# Patient Record
Sex: MALE | Race: WHITE | NOT HISPANIC OR LATINO | Employment: FULL TIME | URBAN - METROPOLITAN AREA
[De-identification: names, ages, dates, MRNs, and addresses within clinical notes are randomized per-mention and may not be internally consistent; named-entity substitution may affect disease eponyms.]

---

## 2017-07-20 ENCOUNTER — OFFICE VISIT (OUTPATIENT)
Dept: FAMILY MEDICINE CLINIC | Facility: CLINIC | Age: 47
End: 2017-07-20

## 2017-07-20 VITALS
RESPIRATION RATE: 20 BRPM | SYSTOLIC BLOOD PRESSURE: 108 MMHG | WEIGHT: 207 LBS | DIASTOLIC BLOOD PRESSURE: 62 MMHG | HEIGHT: 76 IN | OXYGEN SATURATION: 98 % | BODY MASS INDEX: 25.21 KG/M2 | HEART RATE: 72 BPM

## 2017-07-20 DIAGNOSIS — F41.9 ANXIETY: ICD-10-CM

## 2017-07-20 DIAGNOSIS — G50.0 TRIGEMINAL NEURALGIA OF RIGHT SIDE OF FACE: Primary | ICD-10-CM

## 2017-07-20 PROBLEM — K21.9 GASTROESOPHAGEAL REFLUX DISEASE WITHOUT ESOPHAGITIS: Status: ACTIVE | Noted: 2017-07-20

## 2017-07-20 PROBLEM — K29.00 ACUTE SUPERFICIAL GASTRITIS WITHOUT HEMORRHAGE: Status: ACTIVE | Noted: 2017-07-20

## 2017-07-20 PROCEDURE — 99203 OFFICE O/P NEW LOW 30 MIN: CPT | Performed by: NURSE PRACTITIONER

## 2017-07-20 RX ORDER — LORATADINE 10 MG/1
10 TABLET ORAL DAILY
COMMUNITY

## 2017-07-20 RX ORDER — CARBAMAZEPINE 200 MG/1
TABLET ORAL
COMMUNITY
Start: 2017-05-15 | End: 2017-11-09 | Stop reason: SDUPTHER

## 2017-07-20 NOTE — PROGRESS NOTES
Sara Carter is a 46 y.o. male.   Chief Complaint   Patient presents with   • Trigeminal Neuralgia     new pt needs to estbalish and needs a referral to neuro to continue treatment, also having dizzy spells which is concerning     Vitals:    07/20/17 1247   BP: 108/62   Pulse: 72   Resp: 20   SpO2: 98%     No Known Allergies    HPI Comments: Getting some lightheaded spells. Kind of feels a little anxious. Was happening a lot when stressed out in [place]. Would come in waves every couple of weeks.   Heart palpitations, no chest pain, no SOB.   Most recently last Friday- had heartburn, got a little light headed, tired, and stomach upset. Felt like heart rate was a little high.  When episodes occur they only last a few minutes. Palpitations may only last a few seconds.    Anxiety   Presents for initial visit. Onset was 1 to 6 months ago. The problem has been waxing and waning. Symptoms include muscle tension and palpitations. Patient reports no chest pain, dizziness, excessive worry, feeling of choking, nervous/anxious behavior, obsessions, restlessness, shortness of breath or suicidal ideas. Symptoms occur occasionally. The severity of symptoms is mild. The symptoms are aggravated by caffeine and work stress. The patient sleeps 7 hours per night. The quality of sleep is good. Nighttime awakenings: none.     Risk factors: work stress at previous job. There is no history of anxiety/panic attacks, arrhythmia, depression or suicide attempts. Past treatments include nothing.        The following portions of the patient's history were reviewed and updated as appropriate: allergies, current medications, past family history, past medical history, past social history, past surgical history and problem list.    Review of Systems   Constitutional: Negative.  Negative for chills, diaphoresis and unexpected weight change.   Respiratory: Positive for chest tightness (sometimes). Negative for shortness of breath.     Cardiovascular: Positive for palpitations. Negative for chest pain and leg swelling.   Neurological: Positive for light-headedness. Negative for dizziness, tremors and syncope.   Psychiatric/Behavioral: Negative for agitation, self-injury, sleep disturbance and suicidal ideas. The patient is not nervous/anxious and is not hyperactive.    All other systems reviewed and are negative.      Objective   Physical Exam   Constitutional: He is oriented to person, place, and time. He appears well-developed and well-nourished.   Cardiovascular: Normal rate, regular rhythm, normal heart sounds and intact distal pulses.    No murmur heard.  Pulmonary/Chest: Effort normal and breath sounds normal.   Neurological: He is alert and oriented to person, place, and time.   Skin: Skin is warm and dry.   Psychiatric: He has a normal mood and affect. His behavior is normal. Judgment and thought content normal.   Nursing note and vitals reviewed.      Assessment/Plan   Problems Addressed this Visit        Nervous and Auditory    Trigeminal neuralgia of right side of face - Primary    Relevant Orders    Ambulatory Referral to Neurology      Other Visit Diagnoses     Anxiety            Lab results from previous blood draw were brought in by the patient and we reviewed them together. Counseled on heart healthy diet to improve cholesterol.  If palpitations worsen or become consistent, give office a call and holter will be ordered.   May consider some atarax PRN for anxiety if episode increase in frequency.

## 2017-08-01 ENCOUNTER — TELEPHONE (OUTPATIENT)
Dept: FAMILY MEDICINE CLINIC | Facility: CLINIC | Age: 47
End: 2017-08-01

## 2017-08-01 DIAGNOSIS — F41.9 ANXIETY: Primary | ICD-10-CM

## 2017-08-01 RX ORDER — HYDROXYZINE HYDROCHLORIDE 25 MG/1
25 TABLET, FILM COATED ORAL EVERY 8 HOURS PRN
Qty: 30 TABLET | Refills: 2 | Status: SHIPPED | OUTPATIENT
Start: 2017-08-01 | End: 2017-11-09 | Stop reason: SDUPTHER

## 2017-09-25 ENCOUNTER — OFFICE VISIT (OUTPATIENT)
Dept: FAMILY MEDICINE CLINIC | Facility: CLINIC | Age: 47
End: 2017-09-25

## 2017-09-25 VITALS
RESPIRATION RATE: 20 BRPM | DIASTOLIC BLOOD PRESSURE: 64 MMHG | WEIGHT: 203 LBS | SYSTOLIC BLOOD PRESSURE: 102 MMHG | BODY MASS INDEX: 24.72 KG/M2 | HEIGHT: 76 IN | HEART RATE: 73 BPM | OXYGEN SATURATION: 98 %

## 2017-09-25 DIAGNOSIS — F41.9 ANXIETY: Primary | ICD-10-CM
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
## 2017-11-09 ENCOUNTER — OFFICE VISIT (OUTPATIENT)
Dept: NEUROLOGY | Facility: CLINIC | Age: 47
End: 2017-11-09

## 2017-11-09 VITALS
WEIGHT: 210 LBS | BODY MASS INDEX: 25.57 KG/M2 | DIASTOLIC BLOOD PRESSURE: 80 MMHG | SYSTOLIC BLOOD PRESSURE: 116 MMHG | HEIGHT: 76 IN

## 2017-11-09 DIAGNOSIS — G50.0 TRIGEMINAL NEURALGIA OF RIGHT SIDE OF FACE: Primary | ICD-10-CM

## 2017-11-09 DIAGNOSIS — M54.81 BILATERAL OCCIPITAL NEURALGIA: ICD-10-CM

## 2017-11-09 DIAGNOSIS — F41.9 ANXIETY: ICD-10-CM

## 2017-11-09 PROCEDURE — 99243 OFF/OP CNSLTJ NEW/EST LOW 30: CPT | Performed by: PSYCHIATRY & NEUROLOGY

## 2017-11-09 RX ORDER — HYDROXYZINE HYDROCHLORIDE 25 MG/1
25 TABLET, FILM COATED ORAL EVERY 8 HOURS PRN
Qty: 30 TABLET | Refills: 3 | Status: SHIPPED | OUTPATIENT
Start: 2017-11-09 | End: 2018-04-04 | Stop reason: SDUPTHER

## 2017-11-09 RX ORDER — CARBAMAZEPINE 200 MG/1
200 TABLET ORAL 3 TIMES DAILY
Qty: 270 TABLET | Refills: 3 | Status: SHIPPED | OUTPATIENT
Start: 2017-11-09 | End: 2018-06-14 | Stop reason: SDUPTHER
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
## 2018-01-23 ENCOUNTER — OFFICE VISIT (OUTPATIENT)
Dept: FAMILY MEDICINE CLINIC | Facility: CLINIC | Age: 48
End: 2018-01-23

## 2018-01-23 VITALS
HEART RATE: 81 BPM | BODY MASS INDEX: 25.69 KG/M2 | HEIGHT: 76 IN | WEIGHT: 211 LBS | OXYGEN SATURATION: 98 % | RESPIRATION RATE: 20 BRPM

## 2018-01-23 DIAGNOSIS — J01.10 ACUTE NON-RECURRENT FRONTAL SINUSITIS: Primary | ICD-10-CM

## 2018-01-23 PROCEDURE — 99213 OFFICE O/P EST LOW 20 MIN: CPT | Performed by: NURSE PRACTITIONER

## 2018-01-23 RX ORDER — AMOXICILLIN AND CLAVULANATE POTASSIUM 875; 125 MG/1; MG/1
1 TABLET, FILM COATED ORAL EVERY 12 HOURS SCHEDULED
Qty: 14 TABLET | Refills: 0 | Status: SHIPPED | OUTPATIENT
Start: 2018-01-23 | End: 2018-01-30

## 2018-01-23 NOTE — PROGRESS NOTES
"Sara Carter is a 47 y.o. male.     Chief Complaint   Patient presents with   • Sinusitis     deep wet cough, sneezing, congetsion, sinus pressure x 10- 14 days. ear pain      Social History   Substance Use Topics   • Smoking status: Never Smoker   • Smokeless tobacco: Never Used   • Alcohol use No       URI    This is a new problem. The current episode started 1 to 4 weeks ago (10 days). The problem has been gradually worsening. There has been no fever. Associated symptoms include congestion, coughing, ear pain (left), headaches, rhinorrhea and sinus pain. Pertinent negatives include no sore throat. He has tried nothing for the symptoms.     Review of Systems   Constitutional: Positive for fatigue. Negative for chills and fever.   HENT: Positive for congestion, ear pain (left), rhinorrhea and sinus pain. Negative for sore throat.    Respiratory: Positive for cough.    Neurological: Positive for headaches.   All other systems reviewed and are negative.    Physical Exam   Gen: mildly ill appearing, alert  Ears: Tm's bulging without redness  Nose:  Congestion  Throat:  Red without exudate, some drainage, tonsils okay  Neck: no LAD  Lung: good air movement, regular RR, no wheeze, no rales  Heart: RR without murmur  Skin: no rash.    The following portions of the patient's history were reviewed and updated as appropriate: allergies, current medications,past medical hx, family hx, past social history an...    Chief Complaint   Patient presents with   • Sinusitis     deep wet cough, sneezing, congetsion, sinus pressure x 10- 14 days. ear pain        Vitals:    01/23/18 1313   Pulse: 81   Resp: 20   SpO2: 98%   Weight: 95.7 kg (211 lb)   Height: 193 cm (76\")       Assessment/Plan   Problems Addressed this Visit     None      Visit Diagnoses     Acute non-recurrent frontal sinusitis    -  Primary    Relevant Medications    amoxicillin-clavulanate (AUGMENTIN) 875-125 MG per tablet               Tylenol or Advil " as needed for pain, fever, muscle aches  Plenty of fluids  Hand washing discussed  Off work or school note given if needed.  Warm tea for throat.      Jennifer CHAPMAN  Family Practice  [place]  [facility]

## 2018-04-04 DIAGNOSIS — F41.9 ANXIETY: ICD-10-CM

## 2018-04-04 RX ORDER — HYDROXYZINE HYDROCHLORIDE 25 MG/1
25 TABLET, FILM COATED ORAL EVERY 8 HOURS PRN
Qty: 30 TABLET | Refills: 3 | Status: SHIPPED | OUTPATIENT
Start: 2018-04-04 | End: 2018-08-20 | Stop reason: SDUPTHER

## 2018-04-09 ENCOUNTER — OFFICE VISIT (OUTPATIENT)
Dept: FAMILY MEDICINE CLINIC | Facility: CLINIC | Age: 48
End: 2018-04-09

## 2018-04-09 VITALS
OXYGEN SATURATION: 100 % | HEART RATE: 65 BPM | RESPIRATION RATE: 16 BRPM | TEMPERATURE: 97.9 F | SYSTOLIC BLOOD PRESSURE: 122 MMHG | HEIGHT: 76 IN | DIASTOLIC BLOOD PRESSURE: 64 MMHG | WEIGHT: 212 LBS | BODY MASS INDEX: 25.82 KG/M2

## 2018-04-09 DIAGNOSIS — R19.5 LOOSE STOOLS: ICD-10-CM

## 2018-04-09 DIAGNOSIS — R10.11 RUQ PAIN: Primary | ICD-10-CM

## 2018-04-09 DIAGNOSIS — R11.0 NAUSEA: ICD-10-CM

## 2018-04-09 DIAGNOSIS — G50.0 TRIGEMINAL NEURALGIA OF RIGHT SIDE OF FACE: ICD-10-CM

## 2018-04-09 PROCEDURE — 99213 OFFICE O/P EST LOW 20 MIN: CPT | Performed by: PHYSICIAN ASSISTANT

## 2018-04-09 NOTE — PROGRESS NOTES
Subjective   Donald Carter is a 47 y.o. male.   Chief Complaint   Patient presents with   • Pain     right side       History of Present Illness     Donald is a 47-year-old male who presents for  Right mid abdomen pain off and on for the past 6-8 weeks.  He has had some increased heartburn/reflux off on for the past several months.  He has had some pain radiating to right scapula area off and on for months.   States the pain would waxes and wanes.  Donald has had some loose stool-diarrhea off and on for a few weeks.   Describes the pain as a dull ache that waxes and wane.  States the pain is in th morning.  Eating makes the pain worse.  Donald has had increased flatulence and belching/bloated feeling.  He has had an occasional nauseated feeling.  Denied any dark back tarry stools.  Denied any chest pain,urinary, hematuria,fevers,or chills.  He avoid diary and wheat due to sensitives.  Appetite has been slightly less than normal.  Sleep has been normal.   He takes Prilosec OTC once a day.  Caffeine is 10 oz coffee a day.  Donald states his stress level is normal.  Donald sees neurologist,brenna Calle, for trigeminal neuralgia.  Herbert a tegretol level collected and results sent to him.    The following portions of the patient's history were reviewed and updated as appropriate: allergies, current medications, past family history, past medical history, past social history and past surgical history.    Review of Systems   Constitutional: Negative.  Negative for chills, fatigue and fever.   HENT: Negative.    Eyes: Negative.    Respiratory: Negative.  Negative for cough, shortness of breath and wheezing.    Cardiovascular: Negative.  Negative for chest pain, palpitations and leg swelling.   Gastrointestinal: Positive for abdominal pain, diarrhea and nausea. Negative for blood in stool, constipation, rectal pain and vomiting.        Heartburn,bloated,belching and flatulence   Endocrine: Negative.    Genitourinary: Negative.   "  Musculoskeletal: Negative.    Skin: Negative.    Allergic/Immunologic: Negative.    Neurological: Negative.    Hematological: Negative.    Psychiatric/Behavioral: Negative.    All other systems reviewed and are negative.      Social History     Social History   • Marital status:      Social History Main Topics   • Smoking status: Never Smoker   • Smokeless tobacco: Never Used   • Alcohol use No   • Drug use: No   • Sexual activity: Yes     Partners: Female     Other Topics Concern   • Not on file     Vitals:    04/09/18 1330   BP: 122/64   BP Location: Left arm   Patient Position: Sitting   Cuff Size: Adult   Pulse: 65   Resp: 16   Temp: 97.9 °F (36.6 °C)   TempSrc: Oral   SpO2: 100%   Weight: 96.2 kg (212 lb)   Height: 193 cm (76\")     Wt Readings from Last 3 Encounters:   04/09/18 96.2 kg (212 lb)   01/23/18 95.7 kg (211 lb)   11/09/17 95.3 kg (210 lb)       BP Readings from Last 3 Encounters:   04/09/18 122/64   11/09/17 116/80   09/25/17 102/64       Body mass index is 25.81 kg/m².  No Known Allergies    Objective   Physical Exam   Constitutional: He is oriented to person, place, and time. Vital signs are normal. He appears well-developed and well-nourished.   Neck: Trachea normal and phonation normal. Neck supple. No edema present.   Cardiovascular: Normal rate, regular rhythm, S1 normal, S2 normal, normal heart sounds and normal pulses.    Pulmonary/Chest: Effort normal and breath sounds normal.   Abdominal: Soft. Normal appearance, normal aorta and bowel sounds are normal. There is no hepatomegaly. There is tenderness in the right upper quadrant. There is positive Bhatti's sign. There is no rigidity, no rebound, no guarding, no CVA tenderness and no tenderness at McBurney's point.       Neurological: He is alert and oriented to person, place, and time.   Skin: Skin is warm, dry and intact.   Psychiatric: He has a normal mood and affect. His speech is normal and behavior is normal. Judgment and " thought content normal. Cognition and memory are normal.       Assessment/Plan   Donald was seen today for pain.    Diagnoses and all orders for this visit:    RUQ pain  -     CBC & Differential  -     US Abdomen Complete  -     Amylase  -     Lipase  -     Comprehensive Metabolic Panel    Nausea  -     CBC & Differential  -     US Abdomen Complete  -     Amylase  -     Lipase  -     Comprehensive Metabolic Panel    Loose stools  -     CBC & Differential  -     US Abdomen Complete  -     Amylase  -     Lipase  -     Comprehensive Metabolic Panel    Trigeminal neuralgia of right side of face  -     Carbamazepine level, total    Other orders  -     Carbamazepine Level, Total      Mr. Donald Carter was seen in office today with continuing right upper quadrant pain with nausea and loose stools.  He will have a CBC, CMP, amylase and lipase blood work collected at today's office visit.  I will schedule a ultrasound of abdomen for further evaluation of gallbladder issues.  He will continue his over-the-counter Prilosec for now.  Donald will be notified of test results when completed.  I have asked him to keep a food diary of symptoms as well.  He voiced understanding. In additional to above blood work, he will have a tegretol level collect today. I will send results to his neurologist,brenna Calle.

## 2018-04-10 LAB
ALBUMIN SERPL-MCNC: 4.2 G/DL (ref 3.5–5.2)
ALBUMIN/GLOB SERPL: 1.8 G/DL
ALP SERPL-CCNC: 59 U/L (ref 40–129)
ALT SERPL-CCNC: 18 U/L (ref 5–41)
AMYLASE SERPL-CCNC: 58 U/L (ref 28–100)
AST SERPL-CCNC: 19 U/L (ref 5–40)
BASOPHILS # BLD AUTO: 0.01 10*3/MM3 (ref 0–0.2)
BASOPHILS NFR BLD AUTO: 0.2 % (ref 0–2)
BILIRUB SERPL-MCNC: 0.3 MG/DL (ref 0.2–1.2)
BUN SERPL-MCNC: 10 MG/DL (ref 6–20)
BUN/CREAT SERPL: 12.5 (ref 7–25)
CALCIUM SERPL-MCNC: 9.2 MG/DL (ref 8.6–10.5)
CARBAMAZEPINE SERPL-MCNC: 5.4 MCG/ML (ref 4–12)
CHLORIDE SERPL-SCNC: 96 MMOL/L (ref 98–107)
CO2 SERPL-SCNC: 29.9 MMOL/L (ref 22–29)
CREAT SERPL-MCNC: 0.8 MG/DL (ref 0.76–1.27)
EOSINOPHIL # BLD AUTO: 0 10*3/MM3 (ref 0.1–0.3)
EOSINOPHIL NFR BLD AUTO: 0 % (ref 0–4)
ERYTHROCYTE [DISTWIDTH] IN BLOOD BY AUTOMATED COUNT: 12.1 % (ref 11.5–14.5)
GFR SERPLBLD CREATININE-BSD FMLA CKD-EPI: 104 ML/MIN/1.73
GFR SERPLBLD CREATININE-BSD FMLA CKD-EPI: 126 ML/MIN/1.73
GLOBULIN SER CALC-MCNC: 2.4 GM/DL
GLUCOSE SERPL-MCNC: 88 MG/DL (ref 65–99)
HCT VFR BLD AUTO: 40 % (ref 42–52)
HGB BLD-MCNC: 13.3 G/DL (ref 14–18)
IMM GRANULOCYTES # BLD: 0.01 10*3/MM3 (ref 0–0.03)
IMM GRANULOCYTES NFR BLD: 0.2 % (ref 0–0.5)
LIPASE SERPL-CCNC: 28 U/L (ref 13–60)
LYMPHOCYTES # BLD AUTO: 2.61 10*3/MM3 (ref 0.6–4.8)
LYMPHOCYTES NFR BLD AUTO: 44.2 % (ref 20–45)
MCH RBC QN AUTO: 31.1 PG (ref 27–31)
MCHC RBC AUTO-ENTMCNC: 33.3 G/DL (ref 31–37)
MCV RBC AUTO: 93.7 FL (ref 80–94)
MONOCYTES # BLD AUTO: 0.4 10*3/MM3 (ref 0–1)
MONOCYTES NFR BLD AUTO: 6.8 % (ref 3–8)
NEUTROPHILS # BLD AUTO: 2.87 10*3/MM3 (ref 1.5–8.3)
NEUTROPHILS NFR BLD AUTO: 48.6 % (ref 45–70)
NRBC BLD AUTO-RTO: 0 /100 WBC (ref 0–0)
PLATELET # BLD AUTO: 305 10*3/MM3 (ref 140–500)
POTASSIUM SERPL-SCNC: 4 MMOL/L (ref 3.5–5.2)
PROT SERPL-MCNC: 6.6 G/DL (ref 6–8.5)
RBC # BLD AUTO: 4.27 10*6/MM3 (ref 4.7–6.1)
SODIUM SERPL-SCNC: 135 MMOL/L (ref 136–145)
WBC # BLD AUTO: 5.9 10*3/MM3 (ref 4.8–10.8)

## 2018-04-16 ENCOUNTER — HOSPITAL ENCOUNTER (OUTPATIENT)
Dept: ULTRASOUND IMAGING | Facility: HOSPITAL | Age: 48
Discharge: HOME | End: 2018-04-16
Admitting: PHYSICIAN ASSISTANT

## 2018-04-16 PROCEDURE — 76700 US EXAM ABDOM COMPLETE: CPT

## 2018-04-18 DIAGNOSIS — R19.5 LOOSE STOOLS: ICD-10-CM

## 2018-04-18 DIAGNOSIS — R10.11 RUQ PAIN: Primary | ICD-10-CM

## 2018-04-18 DIAGNOSIS — R11.0 NAUSEA: ICD-10-CM

## 2018-04-25 ENCOUNTER — HOSPITAL ENCOUNTER (OUTPATIENT)
Dept: NUCLEAR MEDICINE | Facility: HOSPITAL | Age: 48
Discharge: HOME | End: 2018-04-25

## 2018-04-25 DIAGNOSIS — R19.5 LOOSE STOOLS: ICD-10-CM

## 2018-04-25 DIAGNOSIS — R11.0 NAUSEA: ICD-10-CM

## 2018-04-25 DIAGNOSIS — R10.11 RUQ PAIN: Primary | ICD-10-CM

## 2018-04-25 DIAGNOSIS — R10.11 RUQ PAIN: ICD-10-CM

## 2018-04-25 PROCEDURE — 0 TECHNETIUM TC 99M MEBROFENIN KIT: Performed by: PHYSICIAN ASSISTANT

## 2018-04-25 PROCEDURE — 78227 HEPATOBIL SYST IMAGE W/DRUG: CPT

## 2018-04-25 PROCEDURE — 25010000002 SINCALIDE PER 5 MCG: Performed by: PHYSICIAN ASSISTANT

## 2018-04-25 PROCEDURE — A9537 TC99M MEBROFENIN: HCPCS | Performed by: PHYSICIAN ASSISTANT

## 2018-04-25 RX ORDER — KIT FOR THE PREPARATION OF TECHNETIUM TC 99M MEBROFENIN 45 MG/10ML
1 INJECTION, POWDER, LYOPHILIZED, FOR SOLUTION INTRAVENOUS
Status: COMPLETED | OUTPATIENT
Start: 2018-04-25 | End: 2018-04-25

## 2018-04-25 RX ADMIN — SINCALIDE 1.9 MCG: 5 INJECTION, POWDER, LYOPHILIZED, FOR SOLUTION INTRAVENOUS at 09:45

## 2018-04-25 RX ADMIN — MEBROFENIN 1 DOSE: 45 INJECTION, POWDER, LYOPHILIZED, FOR SOLUTION INTRAVENOUS at 07:45

## 2018-05-04 ENCOUNTER — TELEPHONE (OUTPATIENT)
Dept: FAMILY MEDICINE CLINIC | Facility: CLINIC | Age: 48
End: 2018-05-04

## 2018-05-04 DIAGNOSIS — R11.0 NAUSEA: ICD-10-CM

## 2018-05-04 DIAGNOSIS — R19.5 LOOSE STOOLS: Primary | ICD-10-CM

## 2018-05-04 RX ORDER — DICYCLOMINE HYDROCHLORIDE 10 MG/1
10 CAPSULE ORAL
Qty: 120 CAPSULE | Refills: 0 | Status: SHIPPED | OUTPATIENT
Start: 2018-05-04 | End: 2018-06-14

## 2018-05-04 NOTE — TELEPHONE ENCOUNTER
I have sent Bentyl medication to pharmacy.  If no improvement please schedule follow-up appointment.  Keep appointment with GI.

## 2018-05-22 ENCOUNTER — OFFICE VISIT (OUTPATIENT)
Dept: GASTROENTEROLOGY | Facility: CLINIC | Age: 48
End: 2018-05-22

## 2018-05-22 VITALS
DIASTOLIC BLOOD PRESSURE: 74 MMHG | BODY MASS INDEX: 25.77 KG/M2 | SYSTOLIC BLOOD PRESSURE: 122 MMHG | WEIGHT: 211.6 LBS | HEIGHT: 76 IN | TEMPERATURE: 97.6 F

## 2018-05-22 DIAGNOSIS — K21.9 GASTROESOPHAGEAL REFLUX DISEASE, ESOPHAGITIS PRESENCE NOT SPECIFIED: ICD-10-CM

## 2018-05-22 DIAGNOSIS — R14.0 BLOATING: ICD-10-CM

## 2018-05-22 DIAGNOSIS — R10.31 RIGHT LOWER QUADRANT ABDOMINAL PAIN: Primary | ICD-10-CM

## 2018-05-22 PROCEDURE — 99204 OFFICE O/P NEW MOD 45 MIN: CPT | Performed by: INTERNAL MEDICINE

## 2018-05-22 NOTE — PROGRESS NOTES
"Chief Complaint   Patient presents with   • Abdominal Pain   • Nausea   • GI Problem     alternating constipation and diarrhea        Subjective     HPI    Donald Carter is a 47 y.o. male with a past medical history noted below who presents for evaluation of abdominal pain.  Symptoms started in January of this year. He began to notice some \"discomfort\" in the RUQ.  It was constant and then became more intermittent.  Evolved to be more like a cramp in quality. Sometimes radiated into his R shoulder.  He found that it was worse with eating, especially greasy and fatty foods.  Workup with his PCP included a normal RUQ US and HIDA. He was given dicyclomine and is taking this QID.  It is helping a bit but not relieving symptoms completely.  He is also taking an otc digestive enzyme.    He is now having some bloating, alternating loose stools and constipation.  Having lots of gas.  Feels fatigued.  His weight has been stable.    He has adjusted his diet -- avoiding red meat, fatty foods, no dairy, no gluten.    EGD in 2016 in [place] for reflux issues, reportedly normal.  He reports negative testing for celiac disease.  He was having reflux symptoms at the time of the EGD    No family history of GI malignancy.  No smoking, no ETOH.  Works at [employer]-- in [place].  No abdominal surgeries.    He does take prilosec regularly.     He had a normal amylase and lipase in April.  His carbamazepine level was normal.  His CMP was normal as well.        Past Medical History:   Diagnosis Date   • Gastritis    • GERD (gastroesophageal reflux disease)    • Trigeminal neuralgia          Current Outpatient Prescriptions:   •  carBAMazepine (TEGretol) 200 MG tablet, Take 1 tablet by mouth 3 (Three) Times a Day., Disp: 270 tablet, Rfl: 3  •  dicyclomine (BENTYL) 10 MG capsule, Take 1 capsule by mouth 4 (Four) Times a Day Before Meals & at Bedtime., Disp: 120 capsule, Rfl: 0  •  Digestive Enzymes (DIGESTIVE ENZYME PO), Take  by mouth., " Disp: , Rfl:   •  hydrOXYzine (ATARAX) 25 MG tablet, Take 1 tablet by mouth Every 8 (Eight) Hours As Needed for Itching or Anxiety., Disp: 30 tablet, Rfl: 3  •  Multiple Vitamins-Minerals (MULTIVITAMIN PO), Take  by mouth., Disp: , Rfl:   •  Omeprazole Magnesium (PRILOSEC OTC PO), Take  by mouth., Disp: , Rfl:   •  Cholecalciferol (VITAMIN D3) 5000 UNITS capsule capsule, Take 5,000 Units by mouth Daily., Disp: , Rfl:   •  loratadine (CLARITIN) 10 MG tablet, Take 10 mg by mouth Daily., Disp: , Rfl:     No Known Allergies    Social History     Social History   • Marital status:      Spouse name: N/A   • Number of children: N/A   • Years of education: N/A     Occupational History   • Not on file.     Social History Main Topics   • Smoking status: Never Smoker   • Smokeless tobacco: Never Used   • Alcohol use No   • Drug use: No   • Sexual activity: Yes     Partners: Female     Other Topics Concern   • Not on file     Social History Narrative   • No narrative on file       Family History   Problem Relation Age of Onset   • Hypertension Mother    • Hyperlipidemia Mother    • Hypertension Father    • Hyperlipidemia Father    • No Known Problems Brother    • No Known Problems Daughter    • No Known Problems Son    • COPD Paternal Grandfather    • Lupus Paternal Grandfather        Review of Systems   Constitutional: Positive for fatigue. Negative for activity change and appetite change.   HENT: Negative for sore throat and trouble swallowing.    Respiratory: Negative.    Cardiovascular: Negative.    Gastrointestinal: Positive for abdominal distention, abdominal pain, constipation and diarrhea. Negative for blood in stool.   Endocrine: Negative for cold intolerance and heat intolerance.   Genitourinary: Negative for difficulty urinating, dysuria and frequency.   Musculoskeletal: Negative for arthralgias, back pain and myalgias.   Skin: Negative.    Hematological: Negative for adenopathy. Does not bruise/bleed easily.    All other systems reviewed and are negative.      Objective     Vitals:    05/22/18 1352   BP: 122/74   Temp: 97.6 °F (36.4 °C)     1    05/22/18  1352   Weight: 96 kg (211 lb 9.6 oz)     Body mass index is 25.76 kg/m².    Physical Exam   Constitutional: He is oriented to person, place, and time. He appears well-developed and well-nourished. No distress.   HENT:   Head: Normocephalic and atraumatic.   Right Ear: External ear normal.   Left Ear: External ear normal.   Nose: Nose normal.   Mouth/Throat: Oropharynx is clear and moist.   Eyes: Conjunctivae and EOM are normal. Right eye exhibits no discharge. Left eye exhibits no discharge. No scleral icterus.   Neck: Normal range of motion. Neck supple. No thyromegaly present.   No supraclavicular adenopathy   Cardiovascular: Normal rate, regular rhythm, normal heart sounds and intact distal pulses.  Exam reveals no gallop.    No murmur heard.  No lower extremity edema   Pulmonary/Chest: Effort normal and breath sounds normal. No respiratory distress. He has no wheezes.   Abdominal: Soft. Normal appearance and bowel sounds are normal. He exhibits no distension and no mass. There is no hepatosplenomegaly. There is tenderness. There is no rigidity, no rebound and no guarding. No hernia.   Quite tender to palpation in the right mid lower quadrant   Genitourinary:   Genitourinary Comments: Rectal exam deferred   Musculoskeletal: Normal range of motion. He exhibits no edema or tenderness.   No atrophy of upper or lower extremities.  Normal digits and nails of both hands.   Lymphadenopathy:     He has no cervical adenopathy.   Neurological: He is alert and oriented to person, place, and time. He displays no atrophy. Coordination normal.   Skin: Skin is warm and dry. No rash noted. He is not diaphoretic. No erythema.   Psychiatric: He has a normal mood and affect. His behavior is normal. Judgment and thought content normal.   Vitals reviewed.      WBC   Date Value Ref Range  Status   04/09/2018 5.90 4.80 - 10.80 10*3/mm3 Final     RBC   Date Value Ref Range Status   04/09/2018 4.27 (L) 4.70 - 6.10 10*6/mm3 Final     Hemoglobin   Date Value Ref Range Status   04/09/2018 13.3 (L) 14.0 - 18.0 g/dL Final     Hematocrit   Date Value Ref Range Status   04/09/2018 40.0 (L) 42.0 - 52.0 % Final     MCV   Date Value Ref Range Status   04/09/2018 93.7 80.0 - 94.0 fL Final     MCH   Date Value Ref Range Status   04/09/2018 31.1 (H) 27.0 - 31.0 pg Final     MCHC   Date Value Ref Range Status   04/09/2018 33.3 31.0 - 37.0 g/dL Final     RDW   Date Value Ref Range Status   04/09/2018 12.1 11.5 - 14.5 % Final     Platelets   Date Value Ref Range Status   04/09/2018 305 140 - 500 10*3/mm3 Final     Neutrophil Rel %   Date Value Ref Range Status   04/09/2018 48.6 45.0 - 70.0 % Final     Lymphocyte Rel %   Date Value Ref Range Status   04/09/2018 44.2 20.0 - 45.0 % Final     Monocyte Rel %   Date Value Ref Range Status   04/09/2018 6.8 3.0 - 8.0 % Final     Eosinophil Rel %   Date Value Ref Range Status   04/09/2018 0.0 0.0 - 4.0 % Final     Basophil Rel %   Date Value Ref Range Status   04/09/2018 0.2 0.0 - 2.0 % Final     Neutrophils Absolute   Date Value Ref Range Status   04/09/2018 2.87 1.50 - 8.30 10*3/mm3 Final     Lymphocytes Absolute   Date Value Ref Range Status   04/09/2018 2.61 0.60 - 4.80 10*3/mm3 Final     Monocytes Absolute   Date Value Ref Range Status   04/09/2018 0.40 0.00 - 1.00 10*3/mm3 Final     Eosinophils Absolute   Date Value Ref Range Status   04/09/2018 0.00 (L) 0.10 - 0.30 10*3/mm3 Final     Basophils Absolute   Date Value Ref Range Status   04/09/2018 0.01 0.00 - 0.20 10*3/mm3 Final     nRBC   Date Value Ref Range Status   04/09/2018 0.0 0.0 - 0.0 /100 WBC Final       Sodium   Date Value Ref Range Status   04/09/2018 135 (L) 136 - 145 mmol/L Final     Potassium   Date Value Ref Range Status   04/09/2018 4.0 3.5 - 5.2 mmol/L Final     Total CO2   Date Value Ref Range Status    04/09/2018 29.9 (H) 22.0 - 29.0 mmol/L Final     Chloride   Date Value Ref Range Status   04/09/2018 96 (L) 98 - 107 mmol/L Final     Creatinine   Date Value Ref Range Status   04/09/2018 0.80 0.76 - 1.27 mg/dL Final     BUN   Date Value Ref Range Status   04/09/2018 10 6 - 20 mg/dL Final     BUN/Creatinine Ratio   Date Value Ref Range Status   04/09/2018 12.5 7.0 - 25.0 Final     Calcium   Date Value Ref Range Status   04/09/2018 9.2 8.6 - 10.5 mg/dL Final     eGFR Non  Am   Date Value Ref Range Status   04/09/2018 104 >60 mL/min/1.73 Final     Alkaline Phosphatase   Date Value Ref Range Status   04/09/2018 59 40 - 129 U/L Final     ALT (SGPT)   Date Value Ref Range Status   04/09/2018 18 5 - 41 U/L Final     AST (SGOT)   Date Value Ref Range Status   04/09/2018 19 5 - 40 U/L Final     Total Bilirubin   Date Value Ref Range Status   04/09/2018 0.3 0.2 - 1.2 mg/dL Final     Albumin   Date Value Ref Range Status   04/09/2018 4.20 3.50 - 5.20 g/dL Final     A/G Ratio   Date Value Ref Range Status   04/09/2018 1.8 g/dL Final       RUQ US  IMPRESSION:  Normal abdominal sonogram.     This report was finalized on 4/17/2018 12:30 PM by Dr. Baldomero Valadez MD.    HIDA  IMPRESSION:  No evidence for cystic duct or common duct obstruction.  Gallbladder  ejection fraction 75%.     This report was finalized on 4/25/2018 12:30 PM by Dr. Pako Gee MD.      No notes on file    Assessment/Plan    Right lower abdominal pain: Interestingly he is quite tender in this area.  His gallbladder workup has been normal and I wonder if this has been actually imaging the wrong region.?  Ileitis    Bloating: Associated with above.?  Functional issue versus dysbiosis    GERD: Stable on omeprazole    Plan    CT of the abdomen and pelvis for further evaluation of this right lower quadrant pain  Trial of VS L #3 probiotic for the bloating  Continue dicyclomine  Add in daily fiber supplementation--recommendations for possible  supplements given    Donald was seen today for abdominal pain, nausea and gi problem.    Diagnoses and all orders for this visit:    Right lower quadrant abdominal pain  -     CT abdomen pelvis w contrast; Future    Bloating    Gastroesophageal reflux disease, esophagitis presence not specified        I have discussed the above plan with the patient.  They verbalize understanding and are in agreement with the plan.  They have been advised to contact the office for any questions, concerns, or changes related to their health.    Dictated utilizing Dragon dictation

## 2018-05-22 NOTE — PATIENT INSTRUCTIONS
Continue the dicyclomine    Schedule the CT scan    Start the VSL # 3 probiotic    Add in a daily fiber supplement (metamucil, citrucel, etc)    For any additional questions, concerns or changes to your condition after today's office visit please contact the office at [phone].

## 2018-05-31 ENCOUNTER — HOSPITAL ENCOUNTER (OUTPATIENT)
Dept: CT IMAGING | Facility: HOSPITAL | Age: 48
Discharge: HOME | End: 2018-05-31
Attending: INTERNAL MEDICINE | Admitting: INTERNAL MEDICINE

## 2018-05-31 DIAGNOSIS — R10.31 RIGHT LOWER QUADRANT ABDOMINAL PAIN: ICD-10-CM

## 2018-05-31 PROCEDURE — 74177 CT ABD & PELVIS W/CONTRAST: CPT

## 2018-05-31 PROCEDURE — 25010000002 IOPAMIDOL 61 % SOLUTION: Performed by: INTERNAL MEDICINE

## 2018-05-31 RX ADMIN — IOPAMIDOL 85 ML: 612 INJECTION, SOLUTION INTRAVENOUS at 08:06

## 2018-06-04 ENCOUNTER — TELEPHONE (OUTPATIENT)
Dept: GASTROENTEROLOGY | Facility: CLINIC | Age: 48
End: 2018-06-04

## 2018-06-04 DIAGNOSIS — G89.29 CHRONIC RLQ PAIN: Primary | ICD-10-CM

## 2018-06-04 DIAGNOSIS — R10.31 CHRONIC RLQ PAIN: Primary | ICD-10-CM

## 2018-06-04 NOTE — PROGRESS NOTES
Please let him know there is a small stone within the appendix.  There is no associated inflammation.  However the radiologist feels that this may be causing some chronic issues with the pain.  I'm referring him to general surgery for further evaluation.    To ER for any worsening symptoms.

## 2018-06-04 NOTE — TELEPHONE ENCOUNTER
Call to pt.  Advise per Dr Mijares that small stone within the appendix.  No associated inflammation.  However, the radiologist feels that this may be causing some chronic issues with the pain.  Referral has been placed to general surgery for further eval.    To ER for any worsening symptoms.  Pt verb understanding.

## 2018-06-04 NOTE — TELEPHONE ENCOUNTER
----- Message from Arlen Mijares MD sent at 6/4/2018 12:21 PM EDT -----  Please let him know there is a small stone within the appendix.  There is no associated inflammation.  However the radiologist feels that this may be causing some chronic issues with the pain.  I'm referring him to general surgery for further evaluation.    To ER for any worsening symptoms.

## 2018-06-14 ENCOUNTER — OFFICE VISIT (OUTPATIENT)
Dept: SURGERY | Facility: CLINIC | Age: 48
End: 2018-06-14

## 2018-06-14 VITALS — BODY MASS INDEX: 24.6 KG/M2 | HEIGHT: 76 IN | HEART RATE: 74 BPM | OXYGEN SATURATION: 97 % | WEIGHT: 202 LBS

## 2018-06-14 DIAGNOSIS — R10.31 RIGHT LOWER QUADRANT PAIN: ICD-10-CM

## 2018-06-14 DIAGNOSIS — G50.0 TRIGEMINAL NEURALGIA OF RIGHT SIDE OF FACE: ICD-10-CM

## 2018-06-14 DIAGNOSIS — K38.1 FECALITH OF APPENDIX: Primary | ICD-10-CM

## 2018-06-14 DIAGNOSIS — M54.81 OCCIPITAL NEURALGIA, UNSPECIFIED LATERALITY: Primary | ICD-10-CM

## 2018-06-14 PROCEDURE — 99203 OFFICE O/P NEW LOW 30 MIN: CPT | Performed by: SURGERY

## 2018-06-14 RX ORDER — CARBAMAZEPINE 200 MG/1
400 TABLET ORAL 2 TIMES DAILY
Qty: 360 TABLET | Refills: 3 | Status: SHIPPED | OUTPATIENT
Start: 2018-06-14 | End: 2019-08-18 | Stop reason: SDUPTHER

## 2018-06-14 RX ORDER — FLUTICASONE PROPIONATE 50 MCG
2 SPRAY, SUSPENSION (ML) NASAL DAILY
COMMUNITY

## 2018-06-14 RX ORDER — CEFAZOLIN SODIUM 2 G/100ML
2 INJECTION, SOLUTION INTRAVENOUS ONCE
Status: CANCELLED | OUTPATIENT
Start: 2018-06-22 | End: 2018-06-22

## 2018-06-14 NOTE — PROGRESS NOTES
General Surgery  Initial Office Visit    CC: Abdominal pain    HPI: The patient is a pleasant 47 y.o. year-old gentleman who presents today for evaluation of right lower quadrant abdominal pain that is a stabbing/aching sensation that occurs daily but only intermittently with radiation to the back.  The pain is worse with movement and also after eating.  The frequency of these right lower quadrant abdominal painful attacks is increasing in frequency, and the number of days that he is pain-free are becoming more and more rare.  He denies any fevers but has had subjective chills and flushing.  He had workup at the end of April for this involving a gallbladder ultrasound as well as a HIDA scan, both of which returned normal.  He was referred to gastroenterology, and saw Dr. Arlen Mijares at the end of May.  A CT of the abdomen and pelvis was performed given his focal right lower quadrant abdominal pain, and this revealed a fecalith near the distal end of the appendix with some mural wall thickening.  He was referred to see me for possible chronic appendicitis.  Along with the abdominal pain, he has noticed some associated abdominal bloating fatigue.    Past Medical History:   GERD  Trigeminal neuralgia  Occipital neuralgia    Past Surgical History:   EGD (in [place])    Medications:   Tegretol 200 mg each morning and 400 mg nightly  Fiber supplement daily  Claritin 10 mg daily  Prilosec OTC once daily  Atarax 10 mg daily  Multivitamin daily  Flonase once daily    Allergies: No known drug allergies, positive wheat and dairy allergies    Family History: No family history of gastrointestinal malignancy    Social History:  of  for the [employer], nonsmoker, no regular alcohol use,     ROS:   Constitutional: Positive for fatigue and subjective chills; Negative for fevers  HENT: Positive for mouth sores due to wheat and dairy allergies; Negative for hearing loss or runny nose  Eyes: Negative for  vision changes or scleral icterus  Respiratory: Negative for cough or shortness of breath  Cardiovascular: Negative for chest pain or heart palpitations  Gastrointestinal: As a for abdominal pain; Negative for nausea, vomiting, constipation, melena, or hematochezia  Genitourinary: Negative for hematuria or dysuria  Musculoskeletal: Negative for joint pain or back pain  Neurologic: Negative for headaches or dizziness  Psychiatric: Negative for anxiety or depression  All other systems reviewed and negative    Physical Exam:  Vitals:    06/14/18 1347   Pulse: 74   SpO2: 97%   Height: 76 inches  Weight 202 lb  BMI 24.6  General: No acute distress, well-nourished & well-developed  HEAD: normocephalic, atraumatic  EYES: normal conjunctiva, sclera anicteric  EARS: grossly normal hearing  NECK: supple, no thyromegaly  CARDIOVASCULAR: regular rate and rhythm  RESPIRATORY: clear to auscultation bilaterally  GASTROINTESTINAL: soft, nontender, non-distended  MUSCULOSKELETAL: normal gait and station. No gross extremity abnormalities  PSYCHIATRIC: oriented x3, normal mood and affect    IMAGING:  CT ABDOMEN/PELVIS (May 31, 2018):  IMPRESSION:  1. No acute abnormality within the abdomen and pelvis.  2. Prominent 7-8 mm appendicolith within the mid to distal body of the appendix with mild circumferential mural thickening at this level. No evidence of any acute associated inflammatory change. Chronic appendicitis is a possibility.    HIDA SCAN (4/25/2018):  IMPRESSION:  No evidence for cystic duct or common duct obstruction.  Gallbladder ejection fraction 75%.    ABDOMINAL ULTRASOUND (4/16/2018):  IMPRESSION:  Normal abdominal sonogram.    ASSESSMENT & PLAN  Mr. jenkins is a 47-year-old gentleman with right lower quadrant pain, likely due to an intraluminal fecalith at the distal end of the appendix.  I personally reviewed his CT scan and have recommended we proceed with a laparoscopic appendectomy.  I discussed the risks of this  procedure to include bleeding, possible wound infection, and possible damage to surrounding structures.  Despite these risks, he has consented to proceed and we have scheduled him for next Friday.    Haydee Cortez MD  General and Endoscopic Surgery  [facility]    [address] [place] [address], [address]  [place], [place], [address]  P: [phone]  F: [phone]

## 2018-06-20 ENCOUNTER — APPOINTMENT (OUTPATIENT)
Dept: PREADMISSION TESTING | Facility: HOSPITAL | Age: 48
End: 2018-06-20

## 2018-06-20 VITALS
SYSTOLIC BLOOD PRESSURE: 110 MMHG | BODY MASS INDEX: 27.21 KG/M2 | DIASTOLIC BLOOD PRESSURE: 72 MMHG | HEART RATE: 70 BPM | TEMPERATURE: 98 F | RESPIRATION RATE: 20 BRPM | OXYGEN SATURATION: 99 % | HEIGHT: 74 IN | WEIGHT: 212 LBS

## 2018-06-20 DIAGNOSIS — K38.1 FECALITH OF APPENDIX: ICD-10-CM

## 2018-06-20 DIAGNOSIS — R10.31 RIGHT LOWER QUADRANT PAIN: ICD-10-CM

## 2018-06-20 LAB
ANION GAP SERPL CALCULATED.3IONS-SCNC: 10.2 MMOL/L
BUN BLD-MCNC: 10 MG/DL (ref 6–20)
BUN/CREAT SERPL: 11.6 (ref 7–25)
CALCIUM SPEC-SCNC: 8.6 MG/DL (ref 8.6–10.5)
CHLORIDE SERPL-SCNC: 89 MMOL/L (ref 98–107)
CO2 SERPL-SCNC: 23.8 MMOL/L (ref 22–29)
CREAT BLD-MCNC: 0.86 MG/DL (ref 0.76–1.27)
DEPRECATED RDW RBC AUTO: 40 FL (ref 37–54)
ERYTHROCYTE [DISTWIDTH] IN BLOOD BY AUTOMATED COUNT: 12 % (ref 11.5–14.5)
GFR SERPL CREATININE-BSD FRML MDRD: 96 ML/MIN/1.73
GLUCOSE BLD-MCNC: 96 MG/DL (ref 65–99)
HCT VFR BLD AUTO: 37.8 % (ref 40.4–52.2)
HGB BLD-MCNC: 12.8 G/DL (ref 13.7–17.6)
MCH RBC QN AUTO: 30.9 PG (ref 27–32.7)
MCHC RBC AUTO-ENTMCNC: 33.9 G/DL (ref 32.6–36.4)
MCV RBC AUTO: 91.3 FL (ref 79.8–96.2)
PLATELET # BLD AUTO: 250 10*3/MM3 (ref 140–500)
PMV BLD AUTO: 9 FL (ref 6–12)
POTASSIUM BLD-SCNC: 3.7 MMOL/L (ref 3.5–5.2)
RBC # BLD AUTO: 4.14 10*6/MM3 (ref 4.6–6)
SODIUM BLD-SCNC: 123 MMOL/L (ref 136–145)
WBC NRBC COR # BLD: 5.07 10*3/MM3 (ref 4.5–10.7)

## 2018-06-20 PROCEDURE — 85027 COMPLETE CBC AUTOMATED: CPT | Performed by: SURGERY

## 2018-06-20 PROCEDURE — 80048 BASIC METABOLIC PNL TOTAL CA: CPT | Performed by: SURGERY

## 2018-06-20 PROCEDURE — 36415 COLL VENOUS BLD VENIPUNCTURE: CPT

## 2018-06-22 ENCOUNTER — HOSPITAL ENCOUNTER (OUTPATIENT)
Facility: HOSPITAL | Age: 48
Setting detail: HOSPITAL OUTPATIENT SURGERY
Discharge: HOME | End: 2018-06-22
Attending: SURGERY | Admitting: SURGERY

## 2018-06-22 ENCOUNTER — ANESTHESIA EVENT (OUTPATIENT)
Dept: PERIOP | Facility: HOSPITAL | Age: 48
End: 2018-06-22

## 2018-06-22 ENCOUNTER — ANESTHESIA (OUTPATIENT)
Dept: PERIOP | Facility: HOSPITAL | Age: 48
End: 2018-06-22

## 2018-06-22 VITALS
HEART RATE: 76 BPM | WEIGHT: 205.8 LBS | RESPIRATION RATE: 16 BRPM | BODY MASS INDEX: 25.06 KG/M2 | TEMPERATURE: 97.7 F | SYSTOLIC BLOOD PRESSURE: 129 MMHG | OXYGEN SATURATION: 96 % | HEIGHT: 76 IN | DIASTOLIC BLOOD PRESSURE: 76 MMHG

## 2018-06-22 DIAGNOSIS — R10.31 RIGHT LOWER QUADRANT PAIN: ICD-10-CM

## 2018-06-22 DIAGNOSIS — K38.1 FECALITH OF APPENDIX: ICD-10-CM

## 2018-06-22 PROBLEM — K36 CHRONIC APPENDICITIS: Status: ACTIVE | Noted: 2018-06-22

## 2018-06-22 PROCEDURE — 25010000002 MIDAZOLAM PER 1 MG: Performed by: NURSE ANESTHETIST, CERTIFIED REGISTERED

## 2018-06-22 PROCEDURE — 25010000002 HYDROMORPHONE PER 4 MG: Performed by: NURSE ANESTHETIST, CERTIFIED REGISTERED

## 2018-06-22 PROCEDURE — 25010000002 FENTANYL CITRATE (PF) 100 MCG/2ML SOLUTION: Performed by: NURSE ANESTHETIST, CERTIFIED REGISTERED

## 2018-06-22 PROCEDURE — 25010000003 CEFAZOLIN IN DEXTROSE 2-4 GM/100ML-% SOLUTION: Performed by: SURGERY

## 2018-06-22 PROCEDURE — 25010000002 ONDANSETRON PER 1 MG: Performed by: NURSE ANESTHETIST, CERTIFIED REGISTERED

## 2018-06-22 PROCEDURE — 44970 LAPAROSCOPY APPENDECTOMY: CPT | Performed by: SURGERY

## 2018-06-22 PROCEDURE — 25010000002 PROPOFOL 10 MG/ML EMULSION: Performed by: NURSE ANESTHETIST, CERTIFIED REGISTERED

## 2018-06-22 PROCEDURE — 25010000002 NEOSTIGMINE PER 0.5 MG: Performed by: NURSE ANESTHETIST, CERTIFIED REGISTERED

## 2018-06-22 PROCEDURE — 25010000002 DEXAMETHASONE PER 1 MG: Performed by: NURSE ANESTHETIST, CERTIFIED REGISTERED

## 2018-06-22 PROCEDURE — 88304 TISSUE EXAM BY PATHOLOGIST: CPT | Performed by: SURGERY

## 2018-06-22 PROCEDURE — 25010000002 MIDAZOLAM PER 1 MG: Performed by: ANESTHESIOLOGY

## 2018-06-22 RX ORDER — OXYCODONE HYDROCHLORIDE AND ACETAMINOPHEN 5; 325 MG/1; MG/1
1 TABLET ORAL EVERY 4 HOURS PRN
Qty: 30 TABLET | Refills: 0 | Status: SHIPPED | OUTPATIENT
Start: 2018-06-22 | End: 2018-07-10

## 2018-06-22 RX ORDER — ROCURONIUM BROMIDE 10 MG/ML
INJECTION, SOLUTION INTRAVENOUS AS NEEDED
Status: DISCONTINUED | OUTPATIENT
Start: 2018-06-22 | End: 2018-06-22 | Stop reason: SURG

## 2018-06-22 RX ORDER — HYDROMORPHONE HCL 110MG/55ML
PATIENT CONTROLLED ANALGESIA SYRINGE INTRAVENOUS AS NEEDED
Status: DISCONTINUED | OUTPATIENT
Start: 2018-06-22 | End: 2018-06-22 | Stop reason: SURG

## 2018-06-22 RX ORDER — HYDROMORPHONE HYDROCHLORIDE 1 MG/ML
0.5 INJECTION, SOLUTION INTRAMUSCULAR; INTRAVENOUS; SUBCUTANEOUS
Status: DISCONTINUED | OUTPATIENT
Start: 2018-06-22 | End: 2018-06-22 | Stop reason: HOSPADM

## 2018-06-22 RX ORDER — PROMETHAZINE HYDROCHLORIDE 25 MG/1
25 SUPPOSITORY RECTAL ONCE AS NEEDED
Status: DISCONTINUED | OUTPATIENT
Start: 2018-06-22 | End: 2018-06-22 | Stop reason: HOSPADM

## 2018-06-22 RX ORDER — NALOXONE HCL 0.4 MG/ML
0.2 VIAL (ML) INJECTION AS NEEDED
Status: DISCONTINUED | OUTPATIENT
Start: 2018-06-22 | End: 2018-06-22 | Stop reason: HOSPADM

## 2018-06-22 RX ORDER — FLUMAZENIL 0.1 MG/ML
0.2 INJECTION INTRAVENOUS AS NEEDED
Status: DISCONTINUED | OUTPATIENT
Start: 2018-06-22 | End: 2018-06-22 | Stop reason: HOSPADM

## 2018-06-22 RX ORDER — GLYCOPYRROLATE 0.2 MG/ML
INJECTION INTRAMUSCULAR; INTRAVENOUS AS NEEDED
Status: DISCONTINUED | OUTPATIENT
Start: 2018-06-22 | End: 2018-06-22 | Stop reason: SURG

## 2018-06-22 RX ORDER — DEXAMETHASONE SODIUM PHOSPHATE 10 MG/ML
INJECTION INTRAMUSCULAR; INTRAVENOUS AS NEEDED
Status: DISCONTINUED | OUTPATIENT
Start: 2018-06-22 | End: 2018-06-22 | Stop reason: SURG

## 2018-06-22 RX ORDER — MIDAZOLAM HYDROCHLORIDE 1 MG/ML
2 INJECTION INTRAMUSCULAR; INTRAVENOUS
Status: DISCONTINUED | OUTPATIENT
Start: 2018-06-22 | End: 2018-06-22 | Stop reason: HOSPADM

## 2018-06-22 RX ORDER — EPHEDRINE SULFATE 50 MG/ML
5 INJECTION, SOLUTION INTRAVENOUS ONCE AS NEEDED
Status: DISCONTINUED | OUTPATIENT
Start: 2018-06-22 | End: 2018-06-22 | Stop reason: HOSPADM

## 2018-06-22 RX ORDER — LABETALOL HYDROCHLORIDE 5 MG/ML
5 INJECTION, SOLUTION INTRAVENOUS
Status: DISCONTINUED | OUTPATIENT
Start: 2018-06-22 | End: 2018-06-22 | Stop reason: HOSPADM

## 2018-06-22 RX ORDER — AMOXICILLIN AND CLAVULANATE POTASSIUM 875; 125 MG/1; MG/1
1 TABLET, FILM COATED ORAL 2 TIMES DAILY
Qty: 14 TABLET | Refills: 0 | Status: SHIPPED | OUTPATIENT
Start: 2018-06-22 | End: 2018-06-29

## 2018-06-22 RX ORDER — PROMETHAZINE HYDROCHLORIDE 25 MG/1
12.5 TABLET ORAL ONCE AS NEEDED
Status: DISCONTINUED | OUTPATIENT
Start: 2018-06-22 | End: 2018-06-22 | Stop reason: HOSPADM

## 2018-06-22 RX ORDER — OXYCODONE AND ACETAMINOPHEN 7.5; 325 MG/1; MG/1
1 TABLET ORAL ONCE AS NEEDED
Status: COMPLETED | OUTPATIENT
Start: 2018-06-22 | End: 2018-06-22

## 2018-06-22 RX ORDER — MAGNESIUM HYDROXIDE 1200 MG/15ML
LIQUID ORAL AS NEEDED
Status: DISCONTINUED | OUTPATIENT
Start: 2018-06-22 | End: 2018-06-22 | Stop reason: HOSPADM

## 2018-06-22 RX ORDER — BUPIVACAINE HYDROCHLORIDE AND EPINEPHRINE 5; 5 MG/ML; UG/ML
INJECTION, SOLUTION EPIDURAL; INTRACAUDAL; PERINEURAL AS NEEDED
Status: DISCONTINUED | OUTPATIENT
Start: 2018-06-22 | End: 2018-06-22 | Stop reason: HOSPADM

## 2018-06-22 RX ORDER — CEFAZOLIN SODIUM 2 G/100ML
2 INJECTION, SOLUTION INTRAVENOUS ONCE
Status: COMPLETED | OUTPATIENT
Start: 2018-06-22 | End: 2018-06-22

## 2018-06-22 RX ORDER — PROPOFOL 10 MG/ML
VIAL (ML) INTRAVENOUS AS NEEDED
Status: DISCONTINUED | OUTPATIENT
Start: 2018-06-22 | End: 2018-06-22 | Stop reason: SURG

## 2018-06-22 RX ORDER — FENTANYL CITRATE 50 UG/ML
50 INJECTION, SOLUTION INTRAMUSCULAR; INTRAVENOUS
Status: DISCONTINUED | OUTPATIENT
Start: 2018-06-22 | End: 2018-06-22 | Stop reason: HOSPADM

## 2018-06-22 RX ORDER — MIDAZOLAM HYDROCHLORIDE 1 MG/ML
1 INJECTION INTRAMUSCULAR; INTRAVENOUS
Status: DISCONTINUED | OUTPATIENT
Start: 2018-06-22 | End: 2018-06-22 | Stop reason: HOSPADM

## 2018-06-22 RX ORDER — MIDAZOLAM HYDROCHLORIDE 1 MG/ML
INJECTION INTRAMUSCULAR; INTRAVENOUS AS NEEDED
Status: DISCONTINUED | OUTPATIENT
Start: 2018-06-22 | End: 2018-06-22 | Stop reason: SURG

## 2018-06-22 RX ORDER — PROMETHAZINE HYDROCHLORIDE 25 MG/ML
12.5 INJECTION, SOLUTION INTRAMUSCULAR; INTRAVENOUS ONCE AS NEEDED
Status: DISCONTINUED | OUTPATIENT
Start: 2018-06-22 | End: 2018-06-22 | Stop reason: HOSPADM

## 2018-06-22 RX ORDER — PROMETHAZINE HYDROCHLORIDE 25 MG/1
25 TABLET ORAL ONCE AS NEEDED
Status: DISCONTINUED | OUTPATIENT
Start: 2018-06-22 | End: 2018-06-22 | Stop reason: HOSPADM

## 2018-06-22 RX ORDER — ONDANSETRON 2 MG/ML
4 INJECTION INTRAMUSCULAR; INTRAVENOUS ONCE AS NEEDED
Status: DISCONTINUED | OUTPATIENT
Start: 2018-06-22 | End: 2018-06-22 | Stop reason: HOSPADM

## 2018-06-22 RX ORDER — FENTANYL CITRATE 50 UG/ML
INJECTION, SOLUTION INTRAMUSCULAR; INTRAVENOUS AS NEEDED
Status: DISCONTINUED | OUTPATIENT
Start: 2018-06-22 | End: 2018-06-22 | Stop reason: SURG

## 2018-06-22 RX ORDER — SODIUM CHLORIDE 0.9 % (FLUSH) 0.9 %
1-10 SYRINGE (ML) INJECTION AS NEEDED
Status: DISCONTINUED | OUTPATIENT
Start: 2018-06-22 | End: 2018-06-22 | Stop reason: HOSPADM

## 2018-06-22 RX ORDER — HYDROCODONE BITARTRATE AND ACETAMINOPHEN 7.5; 325 MG/1; MG/1
1 TABLET ORAL ONCE AS NEEDED
Status: DISCONTINUED | OUTPATIENT
Start: 2018-06-22 | End: 2018-06-22 | Stop reason: HOSPADM

## 2018-06-22 RX ORDER — LIDOCAINE HYDROCHLORIDE 10 MG/ML
0.5 INJECTION, SOLUTION EPIDURAL; INFILTRATION; INTRACAUDAL; PERINEURAL ONCE AS NEEDED
Status: DISCONTINUED | OUTPATIENT
Start: 2018-06-22 | End: 2018-06-22 | Stop reason: HOSPADM

## 2018-06-22 RX ORDER — DIPHENHYDRAMINE HYDROCHLORIDE 50 MG/ML
12.5 INJECTION INTRAMUSCULAR; INTRAVENOUS
Status: DISCONTINUED | OUTPATIENT
Start: 2018-06-22 | End: 2018-06-22 | Stop reason: HOSPADM

## 2018-06-22 RX ORDER — SODIUM CHLORIDE, SODIUM LACTATE, POTASSIUM CHLORIDE, CALCIUM CHLORIDE 600; 310; 30; 20 MG/100ML; MG/100ML; MG/100ML; MG/100ML
9 INJECTION, SOLUTION INTRAVENOUS CONTINUOUS
Status: DISCONTINUED | OUTPATIENT
Start: 2018-06-22 | End: 2018-06-22 | Stop reason: HOSPADM

## 2018-06-22 RX ORDER — LIDOCAINE HYDROCHLORIDE 20 MG/ML
INJECTION, SOLUTION INFILTRATION; PERINEURAL AS NEEDED
Status: DISCONTINUED | OUTPATIENT
Start: 2018-06-22 | End: 2018-06-22 | Stop reason: SURG

## 2018-06-22 RX ORDER — FAMOTIDINE 10 MG/ML
20 INJECTION, SOLUTION INTRAVENOUS ONCE
Status: COMPLETED | OUTPATIENT
Start: 2018-06-22 | End: 2018-06-22

## 2018-06-22 RX ORDER — ONDANSETRON 2 MG/ML
INJECTION INTRAMUSCULAR; INTRAVENOUS AS NEEDED
Status: DISCONTINUED | OUTPATIENT
Start: 2018-06-22 | End: 2018-06-22 | Stop reason: SURG

## 2018-06-22 RX ADMIN — GLYCOPYRROLATE 0.2 MG: 0.2 INJECTION INTRAMUSCULAR; INTRAVENOUS at 13:14

## 2018-06-22 RX ADMIN — FENTANYL CITRATE 50 MCG: 50 INJECTION INTRAMUSCULAR; INTRAVENOUS at 13:23

## 2018-06-22 RX ADMIN — SODIUM CHLORIDE, POTASSIUM CHLORIDE, SODIUM LACTATE AND CALCIUM CHLORIDE: 600; 310; 30; 20 INJECTION, SOLUTION INTRAVENOUS at 12:33

## 2018-06-22 RX ADMIN — OXYCODONE HYDROCHLORIDE AND ACETAMINOPHEN 1 TABLET: 7.5; 325 TABLET ORAL at 15:19

## 2018-06-22 RX ADMIN — FENTANYL CITRATE 50 MCG: 50 INJECTION INTRAMUSCULAR; INTRAVENOUS at 13:01

## 2018-06-22 RX ADMIN — ROCURONIUM BROMIDE 50 MG: 10 INJECTION INTRAVENOUS at 12:39

## 2018-06-22 RX ADMIN — ONDANSETRON 4 MG: 2 INJECTION INTRAMUSCULAR; INTRAVENOUS at 13:33

## 2018-06-22 RX ADMIN — CEFAZOLIN SODIUM 2 G: 2 INJECTION, SOLUTION INTRAVENOUS at 12:45

## 2018-06-22 RX ADMIN — GLYCOPYRROLATE 0.4 MG: 0.2 INJECTION INTRAMUSCULAR; INTRAVENOUS at 13:48

## 2018-06-22 RX ADMIN — FENTANYL CITRATE 50 MCG: 50 INJECTION INTRAMUSCULAR; INTRAVENOUS at 12:35

## 2018-06-22 RX ADMIN — PROPOFOL 200 MG: 10 INJECTION, EMULSION INTRAVENOUS at 12:39

## 2018-06-22 RX ADMIN — NEOSTIGMINE METHYLSULFATE 3 MG: 1 INJECTION INTRAMUSCULAR; INTRAVENOUS; SUBCUTANEOUS at 13:48

## 2018-06-22 RX ADMIN — HYDROMORPHONE HYDROCHLORIDE 0.5 MG: 2 INJECTION INTRAMUSCULAR; INTRAVENOUS; SUBCUTANEOUS at 13:51

## 2018-06-22 RX ADMIN — FENTANYL CITRATE 50 MCG: 50 INJECTION INTRAMUSCULAR; INTRAVENOUS at 13:30

## 2018-06-22 RX ADMIN — MIDAZOLAM 2 MG: 1 INJECTION INTRAMUSCULAR; INTRAVENOUS at 11:08

## 2018-06-22 RX ADMIN — LIDOCAINE HYDROCHLORIDE 100 MG: 20 INJECTION, SOLUTION INFILTRATION; PERINEURAL at 12:39

## 2018-06-22 RX ADMIN — HYDROMORPHONE HYDROCHLORIDE 0.5 MG: 2 INJECTION INTRAMUSCULAR; INTRAVENOUS; SUBCUTANEOUS at 13:46

## 2018-06-22 RX ADMIN — Medication 2 MG: at 12:33

## 2018-06-22 RX ADMIN — DEXAMETHASONE SODIUM PHOSPHATE 10 MG: 10 INJECTION INTRAMUSCULAR; INTRAVENOUS at 12:45

## 2018-06-22 RX ADMIN — SODIUM CHLORIDE, POTASSIUM CHLORIDE, SODIUM LACTATE AND CALCIUM CHLORIDE 9 ML/HR: 600; 310; 30; 20 INJECTION, SOLUTION INTRAVENOUS at 10:39

## 2018-06-22 RX ADMIN — FAMOTIDINE 20 MG: 10 INJECTION, SOLUTION INTRAVENOUS at 11:08

## 2018-06-22 NOTE — ANESTHESIA PREPROCEDURE EVALUATION
Anesthesia Evaluation     Patient summary reviewed and Nursing notes reviewed   NPO Solid Status: > 8 hours  NPO Liquid Status: > 4 hours           Airway   Mallampati: II  Neck ROM: full  No difficulty expected  Dental - normal exam     Pulmonary     breath sounds clear to auscultation  Cardiovascular     Rhythm: regular        Neuro/Psych  (+) numbness,     GI/Hepatic/Renal/Endo    (+)  GERD,      Musculoskeletal     (+) neck pain,   Abdominal    Substance History      OB/GYN          Other                        Anesthesia Plan    ASA 2     general     intravenous induction   Anesthetic plan and risks discussed with patient.

## 2018-06-22 NOTE — ANESTHESIA POSTPROCEDURE EVALUATION
"Patient: Donald HURTADO Small    Procedure Summary     Date:  06/22/18 Room / Location:  [facility] OR 02 / [facility] MAIN OR    Anesthesia Start:  1233 Anesthesia Stop:  1410    Procedure:  Laparoscopic appendectomy (N/A Abdomen) Diagnosis:       Fecalith of appendix      Right lower quadrant pain      (Fecalith of appendix [K38.1])      (Right lower quadrant pain [R10.31])    Surgeon:  Haydee Cortez MD Provider:  Pearl Sampson MD    Anesthesia Type:  general ASA Status:  2          Anesthesia Type: general  Last vitals  BP   133/80 (06/22/18 1535)   Temp   36.5 °C (97.7 °F) (06/22/18 1408)   Pulse   70 (06/22/18 1535)   Resp   16 (06/22/18 1535)     SpO2   98 % (06/22/18 1535)     Post Anesthesia Care and Evaluation    Patient location during evaluation: bedside  Patient participation: complete - patient participated  Level of consciousness: awake and alert  Pain management: adequate  Airway patency: patent  Anesthetic complications: No anesthetic complications    Cardiovascular status: acceptable  Respiratory status: acceptable  Hydration status: acceptable    Comments: /80 (BP Location: Right arm, Patient Position: Sitting)   Pulse 70   Temp 36.5 °C (97.7 °F) (Oral)   Resp 16   Ht 193 cm (76\")   Wt 93.4 kg (205 lb 12.8 oz)   SpO2 98%   BMI 25.05 kg/m²           "

## 2018-06-22 NOTE — ANESTHESIA PROCEDURE NOTES
Airway  Urgency: elective    Airway not difficult    General Information and Staff    Patient location during procedure: OR  Anesthesiologist: GIUSEPPE WHITE  CRNA: ROMI ORTIZ    Indications and Patient Condition  Indications for airway management: airway protection    Preoxygenated: yes  Mask difficulty assessment: 1 - vent by mask    Final Airway Details  Final airway type: endotracheal airway      Successful airway: ETT  Cuffed: yes   Successful intubation technique: direct laryngoscopy  Facilitating devices/methods: intubating stylet  Endotracheal tube insertion site: oral  Blade: Alexx  Blade size: #4  ETT size: 7.5 mm  Cormack-Lehane Classification: grade IIa - partial view of glottis  Placement verified by: chest auscultation and capnometry   Measured from: lips  ETT to lips (cm): 23  Number of attempts at approach: 1    Additional Comments  Preoxygenated with 100% FiO2. Smooth IV induction. Atraumatic insertion. No change to dentition or soft tissue.

## 2018-06-25 ENCOUNTER — TELEPHONE (OUTPATIENT)
Dept: SURGERY | Facility: CLINIC | Age: 48
End: 2018-06-25

## 2018-06-25 LAB
CYTO UR: NORMAL
LAB AP CASE REPORT: NORMAL
PATH REPORT.FINAL DX SPEC: NORMAL
PATH REPORT.GROSS SPEC: NORMAL

## 2018-06-25 RX ORDER — FLUCONAZOLE 200 MG/1
200 TABLET ORAL DAILY
Qty: 2 TABLET | Refills: 0 | Status: SHIPPED | OUTPATIENT
Start: 2018-06-25 | End: 2018-07-10

## 2018-06-25 NOTE — TELEPHONE ENCOUNTER
Diflucan 200 mg 1 po daily # 2 sent to [facility]      He should continue his antibiotics and call if needs more Diflucan

## 2018-06-25 NOTE — TELEPHONE ENCOUNTER
Please call in Diflucan 200 mg PO tablets x2 with instructions for him to take one tablet today and then another tablet in 24 hours if thrush is no better. Zero refills

## 2018-07-02 ENCOUNTER — TELEPHONE (OUTPATIENT)
Dept: GASTROENTEROLOGY | Facility: CLINIC | Age: 48
End: 2018-07-02

## 2018-07-02 NOTE — TELEPHONE ENCOUNTER
Yes to probiotics-- he can either try Align OTC or if we have more of the VSL#3 we can give him some of those as well plus some coupons    If omeprazole is not controlling his heartburn, then adding in daily pepcid may help

## 2018-07-02 NOTE — TELEPHONE ENCOUNTER
----- Message from Donald aCrter sent at 7/2/2018  9:33 AM EDT -----  Regarding: Non-Urgent Medical Question  Contact: [phone]  Dr. Mijares,    I am now  about 10 days past the appendectomy and doing much better! One thing I am dealing with is a mountain of heartburn. The antibiotics really made it worse as well.    I am wondering if I should do another round of pro-biotic treatment now that I am off of the antibiotics? The therapy samples you gave me seemed to work really well in calming down the stomach issues.    Let me know. Thanks.    Donald

## 2018-07-03 NOTE — TELEPHONE ENCOUNTER
Called pt and advised per Dr Mijares that he can take the probiotics- he can try align otc .     Also advised that if omeprazole not controlling his heartburn , then can add a daily pepcid.  Pt verb understanding.

## 2018-07-10 ENCOUNTER — OFFICE VISIT (OUTPATIENT)
Dept: PAIN MEDICINE | Facility: CLINIC | Age: 48
End: 2018-07-10

## 2018-07-10 VITALS
BODY MASS INDEX: 25.57 KG/M2 | TEMPERATURE: 99 F | HEART RATE: 82 BPM | WEIGHT: 210 LBS | OXYGEN SATURATION: 97 % | RESPIRATION RATE: 18 BRPM | DIASTOLIC BLOOD PRESSURE: 73 MMHG | SYSTOLIC BLOOD PRESSURE: 133 MMHG | HEIGHT: 76 IN

## 2018-07-10 DIAGNOSIS — M54.81 BILATERAL OCCIPITAL NEURALGIA: ICD-10-CM

## 2018-07-10 DIAGNOSIS — G50.0 TRIGEMINAL NEURALGIA OF RIGHT SIDE OF FACE: Primary | ICD-10-CM

## 2018-07-10 LAB
POC AMPHETAMINES: NEGATIVE
POC BARBITURATES: NEGATIVE
POC BENZODIAZEPHINES: NEGATIVE
POC COCAINE: NEGATIVE
POC METHADONE: NEGATIVE
POC METHAMPHETAMINE SCREEN URINE: NEGATIVE
POC OPIATES: NEGATIVE
POC OXYCODONE: NEGATIVE
POC PHENCYCLIDINE: NEGATIVE
POC PROPOXYPHENE: NEGATIVE
POC THC: NEGATIVE
POC TRICYCLIC ANTIDEPRESSANTS: POSITIVE

## 2018-07-10 PROCEDURE — 99204 OFFICE O/P NEW MOD 45 MIN: CPT | Performed by: PAIN MEDICINE

## 2018-07-10 PROCEDURE — 80305 DRUG TEST PRSMV DIR OPT OBS: CPT | Performed by: PAIN MEDICINE

## 2018-07-10 NOTE — PROGRESS NOTES
CHIEF COMPLAINT: Neuralgia    Donald Carter is a 47 y.o. male.   He was referred here by Dr. Calderon. He presents to the office for evaluation and treatment of Neuralgia    HPI  Neuralgia  Mr. Carter states that he has had intermittent occipital neuralgia since 2016 but his most recent flare-up started in June 2018. He goes to neurologist Dr. Jas Calderon who treats him for trigeminal neuralgia. Increased his tegretol dose which has helped with his pain. Currently improved and not in pain today.   The patient states their pain is a 0 on a scale of 1-10.  The patient describes this pain as episodic sharp, shooting, stabbing and tingling.  The pain is located in bilateral neck and does radiate posterior head. This painful problem is aggravated by nothing and is alleviated by pain medication and relaxation. Associated with headaches.     Current pain medications: Tegretol 200 mg 2 tabs BID for trigeminal neuralgia    Past therapies:  Physical Therapy: yes ( neck pain)  Chiropractor: yes (neck pain and back pain)  Massage Therapy: no  TENS: yes  Neck or back surgery: no  Past pain management: no    Previous Injections: none    PEG Assessment   What number best describes your pain on average in the past week? 6  What number best describes how, during the past week, pain has interfered with your enjoyment of life? 2  What number best describes how, during the past week, pain has interfered with your general activity? 2      Current Outpatient Prescriptions:   •  carBAMazepine (TEGretol) 200 MG tablet, Take 2 tablets by mouth 2 (Two) Times a Day., Disp: 360 tablet, Rfl: 3  •  FIBER PO, Take 1 tablet by mouth Daily., Disp: , Rfl:   •  fluticasone (FLONASE) 50 MCG/ACT nasal spray, 2 sprays into each nostril Daily., Disp: , Rfl:   •  hydrOXYzine (ATARAX) 25 MG tablet, Take 1 tablet by mouth Every 8 (Eight) Hours As Needed for Itching or Anxiety., Disp: 30 tablet, Rfl: 3  •  loratadine (CLARITIN) 10 MG tablet, Take 10 mg by mouth  "Daily., Disp: , Rfl:   •  Omeprazole Magnesium (PRILOSEC OTC PO), Take 20 mg by mouth Daily., Disp: , Rfl:   •  diclofenac (VOLTAREN) 50 MG EC tablet, Take 1 tablet by mouth 2 (Two) Times a Day As Needed (pain)., Disp: 60 tablet, Rfl: 1    REVIEW OF PERTINENT MEDICAL DATA  Chart reviewed and summarization of all medical records up to new patient visit performed.  Appendix taken out a few weeks ago. Percocet given for post op pain. GERD stable on omeprazole.     IMAGING  Cervical MRI       PFSH:  The following portions of the patient's history were reviewed and updated as appropriate: problem list, past medical history, past surgery history, social history, family history, medications, and allergies    Review of Systems   Constitutional: Negative for fatigue.   HENT: Negative for congestion.    Eyes: Negative for visual disturbance.   Respiratory: Negative for cough, shortness of breath and wheezing.    Cardiovascular: Negative.    Gastrointestinal: Negative for constipation and diarrhea.   Genitourinary: Negative for difficulty urinating.   Musculoskeletal: Positive for neck pain and neck stiffness.   Neurological: Positive for light-headedness and headaches. Negative for dizziness, weakness and numbness.   Psychiatric/Behavioral: Negative for sleep disturbance and suicidal ideas. The patient is not nervous/anxious.    All other systems reviewed and are negative.      Vitals:    07/10/18 1307   BP: 133/73   Pulse: 82   Resp: 18   Temp: 99 °F (37.2 °C)   SpO2: 97%   Weight: 95.3 kg (210 lb)   Height: 193 cm (76\")   PainSc: 0-No pain       Physical Exam   Constitutional: He appears well-developed and well-nourished. No distress.   HENT:   Head: Normocephalic and atraumatic.   Nose: Nose normal.   Mouth/Throat: Oropharynx is clear and moist.   Eyes: Conjunctivae and EOM are normal.   Neck: Normal range of motion. Neck supple.   Cardiovascular: Normal rate, regular rhythm and normal heart sounds.    Pulmonary/Chest: " Effort normal and breath sounds normal. No stridor. No respiratory distress.   Abdominal: Soft. Bowel sounds are normal. He exhibits no distension. There is no tenderness.   Musculoskeletal:        Cervical back: He exhibits normal range of motion, no tenderness and no pain.   Neurological: He is alert. He has normal strength. No cranial nerve deficit or sensory deficit.   Skin: Skin is warm and dry. No rash noted. He is not diaphoretic.   Psychiatric: He has a normal mood and affect. His speech is normal and behavior is normal.   Nursing note and vitals reviewed.    Ortho Exam  Neurologic Exam     Mental Status   Speech: speech is normal     Cranial Nerves     CN III, IV, VI   Extraocular motions are normal.     Motor Exam     Strength   Strength 5/5 throughout.       Lab Results   Component Value Date    POCMETH Negative 07/10/2018    POCAMPHET Negative 07/10/2018    POCBARBITUR Negative 07/10/2018    POCBENZO Negative 07/10/2018    POCCOCAINE Negative 07/10/2018    POCMETHADO Negative 07/10/2018    POCOPIATES Negative 07/10/2018    POCOXYCODO Negative 07/10/2018    POCPHENCYC Negative 07/10/2018    POCPROPOXY Negative 07/10/2018    POCTHC Negative 07/10/2018    POCTRICYC Positive 07/10/2018       Comments: Reviewed POC today      Date of last HEAVEN reviewed : 07/10/18   Comments: Consistent       Donald was seen today for neuralgia.    Diagnoses and all orders for this visit:    Trigeminal neuralgia of right side of face  -     POC Urine Drug Screen, Triage  -     Urine Drug Screen Confirmation - Urine, Clean Catch; Future    Bilateral occipital neuralgia  -     Case Request  -     POC Urine Drug Screen, Triage  -     Urine Drug Screen Confirmation - Urine, Clean Catch; Future    Other orders  -     diclofenac (VOLTAREN) 50 MG EC tablet; Take 1 tablet by mouth 2 (Two) Times a Day As Needed (pain).      Requested Prescriptions     Signed Prescriptions Disp Refills   • diclofenac (VOLTAREN) 50 MG EC tablet 60  tablet 1     Sig: Take 1 tablet by mouth 2 (Two) Times a Day As Needed (pain).     - Imaging reviewed with patient. Upper cervical spine looks good. Shown pictures and discussed places nerve may be impinged.    - Discussed with the patient regarding the etiology of their pain. Informed them that they would likely benefit from a  bilateral greater and lesser occipital nerve steroid injection.  The procedure was described in detail and the risks, benefits and alternatives were discussed with the patient (including but not limited to: bleeding, infection, nerve damage, worsening of pain, inability to perform injection) who agreed to proceed.  given he is improved with increased dose of medication, he wants to wait before he precedes with injection.     - he will call back to schedule when/if needed.   - trial of diclofenac bid prn pain. To use sparingly. Side effects of NSAIDS explained as including but not limited to upset stomach, stomach ulcers, bleeding, kidney failure, fluid retention or high blood pressure. Not to take with any other NSAIDS.   - samples of lorzone given next flare up/muscle spasms of neck.   - if no benefit from OCN discussed may need CMBB to treat source of pain.     Wt Readings from Last 3 Encounters:   07/10/18 95.3 kg (210 lb)   06/22/18 93.4 kg (205 lb 12.8 oz)   06/20/18 96.2 kg (212 lb)     Body mass index is 25.56 kg/m². Patient counseled on the importance of weight loss to help with overall health and pain control. Patient instructed to attempt weight loss.   Plan: Calorie counting reduce portion size, cut out extra servings and reduce fast food intake    Follow-up as needed for pain        Heaven Villafuerte MD  Pain Management

## 2018-07-13 ENCOUNTER — OFFICE VISIT (OUTPATIENT)
Dept: SURGERY | Facility: CLINIC | Age: 48
End: 2018-07-13

## 2018-07-13 ENCOUNTER — PROCEDURE VISIT (OUTPATIENT)
Dept: PAIN MEDICINE | Facility: CLINIC | Age: 48
End: 2018-07-13

## 2018-07-13 VITALS
SYSTOLIC BLOOD PRESSURE: 126 MMHG | DIASTOLIC BLOOD PRESSURE: 77 MMHG | WEIGHT: 209 LBS | OXYGEN SATURATION: 97 % | RESPIRATION RATE: 18 BRPM | BODY MASS INDEX: 25.45 KG/M2 | HEIGHT: 76 IN | HEART RATE: 67 BPM

## 2018-07-13 DIAGNOSIS — K36 CHRONIC APPENDICITIS: Primary | ICD-10-CM

## 2018-07-13 DIAGNOSIS — K38.1 FECALITH OF APPENDIX: ICD-10-CM

## 2018-07-13 DIAGNOSIS — M54.81 BILATERAL OCCIPITAL NEURALGIA: Primary | ICD-10-CM

## 2018-07-13 PROCEDURE — 64405 NJX AA&/STRD GR OCPL NRV: CPT | Performed by: PAIN MEDICINE

## 2018-07-13 PROCEDURE — 99024 POSTOP FOLLOW-UP VISIT: CPT | Performed by: SURGERY

## 2018-07-13 NOTE — PROGRESS NOTES
"CHIEF COMPLAINT: Neuralgia      Donald Carter is a 47 y.o. male.  He is here for the following procedure:  bilateral occipital nerve block.     Vitals:    07/13/18 1509   BP: 126/77   Pulse: 67   Resp: 18   SpO2: 97%   Weight: 94.8 kg (209 lb)   Height: 193 cm (76\")   PainSc:   3   PainLoc: Head       Bupivacaine Lot#: [id number] Exp: 03/2020  Depo Medrol Lot#: [id number] Exp: 06/2019      Bilateral OCN  Date/Time: 7/13/2018 3:43 PM  Performed by: HEAVEN SWANSON  Authorized by: HEAVEN SWANSON   Consent: Verbal consent obtained. Written consent obtained.  Risks and benefits: risks, benefits and alternatives were discussed  Consent given by: patient  Patient understanding: patient states understanding of the procedure being performed  Patient consent: the patient's understanding of the procedure matches consent given  Procedure consent: procedure consent matches procedure scheduled  Patient identity confirmed: verbally with patient  Time out: Immediately prior to procedure a \"time out\" was called to verify the correct patient, procedure, equipment, support staff and site/side marked as required.  Indications: pain relief  Patient position: sitting  Needle size: 25 G  Location technique: anatomical landmarks  Local Anesthetic: bupivacaine 0.25% without epinephrine  Anesthetic total: 9 mL  Outcome: pain improved  Patient tolerance: Patient tolerated the procedure well with no immediate complications          Visit Diagnoses:  1. Bilateral occipital neuralgia        Heaven Swanson MD  Pain Management  "

## 2018-07-13 NOTE — PROGRESS NOTES
CHIEF COMPLAINT:   Chief Complaint   Patient presents with   • Post-op     PO Laparoscopic appendectomy 6/22/18       HISTORY OF PRESENT ILLNESS:  This is a 47 y.o. male who presents for a post-operative visit after undergoing a laparoscopic appendectomy on 6/22/2018 for chronic appendicitis with fecalith. He has recovered quite well with no fevers, chills, nausea, or vomiting.    Pathology:   APPENDIX, APPENDECTOMY:               FOCAL MUCOSAL ACUTE INFLAMMATION.               APPENDICEAL LUMEN WITH FECALITH.               NO EVIDENCE OF TRANSMURAL ACUTE INFLAMMATION.               FIBROUS OBLITERATION OF THE LUMEN NEAR THE DISTAL TIP.               SEROSAL FIBROUS ADHESIONS.    PHYSICAL EXAM:  Lungs: Clear  Heart: RRR  ABD: Incisions are healing well without any erythema or signs of infection.  Ext: no significant edema, calves nontender    A/P:  This is a 47 y.o. male patient who is S/P laparoscopic appendectomy    He is healing beautifully.  I discussed the benign pathology with findings of chronic fibrosis and acute inflammation due to the fecalith.  He can follow-up with me as needed.    Haydee Cortez MD  General and Endoscopic Surgery  [facility], [facility]    [address], [address]  [place], [place], [address]  P: [phone]  F: [phone]

## 2018-07-15 ENCOUNTER — RESULTS ENCOUNTER (OUTPATIENT)
Dept: PAIN MEDICINE | Facility: CLINIC | Age: 48
End: 2018-07-15

## 2018-07-15 DIAGNOSIS — M54.81 BILATERAL OCCIPITAL NEURALGIA: ICD-10-CM

## 2018-07-15 DIAGNOSIS — G50.0 TRIGEMINAL NEURALGIA OF RIGHT SIDE OF FACE: ICD-10-CM

## 2018-07-20 ENCOUNTER — TELEPHONE (OUTPATIENT)
Dept: PAIN MEDICINE | Facility: CLINIC | Age: 48
End: 2018-07-20

## 2018-07-20 NOTE — TELEPHONE ENCOUNTER
Patient LM stating that the Lorazone is not helping and he wanted to know if something else could be called in

## 2018-07-23 RX ORDER — CYCLOBENZAPRINE HCL 5 MG
5 TABLET ORAL 2 TIMES DAILY PRN
Qty: 60 TABLET | Refills: 1 | Status: SHIPPED | OUTPATIENT
Start: 2018-07-23 | End: 2018-11-12 | Stop reason: SDUPTHER

## 2018-07-23 NOTE — TELEPHONE ENCOUNTER
I sent him in a stronger muscle relaxer to try. Let him know: Most common SE: sleepiness, drowsiness; and he needs to avoid driving until he knows how the medication affects him.

## 2018-08-20 DIAGNOSIS — F41.9 ANXIETY: ICD-10-CM

## 2018-08-20 RX ORDER — HYDROXYZINE HYDROCHLORIDE 25 MG/1
TABLET, FILM COATED ORAL
Qty: 30 TABLET | Refills: 0 | Status: SHIPPED | OUTPATIENT
Start: 2018-08-20 | End: 2018-09-29 | Stop reason: SDUPTHER

## 2018-08-24 ENCOUNTER — OFFICE VISIT (OUTPATIENT)
Dept: FAMILY MEDICINE CLINIC | Facility: CLINIC | Age: 48
End: 2018-08-24

## 2018-08-24 VITALS
BODY MASS INDEX: 24.84 KG/M2 | SYSTOLIC BLOOD PRESSURE: 126 MMHG | RESPIRATION RATE: 16 BRPM | WEIGHT: 204 LBS | DIASTOLIC BLOOD PRESSURE: 74 MMHG | HEIGHT: 76 IN | OXYGEN SATURATION: 100 % | TEMPERATURE: 98.2 F | HEART RATE: 66 BPM

## 2018-08-24 DIAGNOSIS — R53.82 CHRONIC FATIGUE: Primary | ICD-10-CM

## 2018-08-24 DIAGNOSIS — Z79.899 HIGH RISK MEDICATION USE: ICD-10-CM

## 2018-08-24 DIAGNOSIS — M25.50 ARTHRALGIA, UNSPECIFIED JOINT: ICD-10-CM

## 2018-08-24 DIAGNOSIS — R21 FACIAL RASH: ICD-10-CM

## 2018-08-24 PROCEDURE — 99214 OFFICE O/P EST MOD 30 MIN: CPT | Performed by: PHYSICIAN ASSISTANT

## 2018-08-24 NOTE — PROGRESS NOTES
Subjective   Donald Carter is a 48 y.o. male.   Chief Complaint   Patient presents with   • dry eyes   • Fatigue       History of Present Illness     Donald is a 48 year old male who presents chronic fatigue symptoms for months. .  He has been having a dry eye.  Donald has seen his eye doctor and has been using dry drops.  States the eye drops have not helped.  He wears contacts with recent eye exam.  He morales had some polyuria but not daily.  Denied any family history off diabetes.  Donald has notice some redness to his bilateral cheeks.   He has had some joint pain off and on.  Paternal grandmother has Lupus.  He has had some canker sores in his mouth.  Bowel movements have been daily with very little stools.  He is seeing OTC stool softener.  Denied  any dark black tarry stools.  Denied any sick contacts.  Donald denied any pica,hair loss,dry skin or abdominal pain/nausea/vomiting/diarrhea.  Appetite has been normal.  Sleep has been normal to restless to increased.  Any change in detergents, lotions, soaps or foods.    The following portions of the patient's history were reviewed and updated as appropriate: allergies, current medications, past family history, past medical history, past social history and past surgical history.    Review of Systems   Constitutional: Positive for fatigue.   HENT: Negative.    Eyes: Negative.         Dry eyes   Respiratory: Negative.  Negative for cough, shortness of breath and wheezing.    Cardiovascular: Negative.  Negative for chest pain, palpitations and leg swelling.   Gastrointestinal: Negative.    Endocrine: Negative.    Genitourinary: Negative.    Musculoskeletal: Positive for arthralgias.   Skin: Positive for rash.   Allergic/Immunologic: Negative.    Neurological: Negative.    Hematological: Negative.    Psychiatric/Behavioral: Negative.  Negative for sleep disturbance and suicidal ideas.   All other systems reviewed and are negative.      Social History     Social History   •  "Marital status:      Social History Main Topics   • Smoking status: Never Smoker   • Smokeless tobacco: Never Used   • Alcohol use No   • Drug use: No   • Sexual activity: Yes     Partners: Female     Other Topics Concern   • Not on file     Vitals:    08/24/18 1132   BP: 126/74   BP Location: Left arm   Patient Position: Sitting   Cuff Size: Adult   Pulse: 66   Resp: 16   Temp: 98.2 °F (36.8 °C)   TempSrc: Oral   SpO2: 100%   Weight: 92.5 kg (204 lb)   Height: 193 cm (76\")       Wt Readings from Last 3 Encounters:   08/24/18 92.5 kg (204 lb)   07/13/18 94.8 kg (209 lb)   07/10/18 95.3 kg (210 lb)       BP Readings from Last 3 Encounters:   08/24/18 126/74   07/13/18 126/77   07/10/18 133/73     Body mass index is 24.83 kg/m².  Allergies   Allergen Reactions   • Oxycodone Other (See Comments)     Makes hyperactive       Objective   Physical Exam   Constitutional: He is oriented to person, place, and time. Vital signs are normal. He appears well-developed and well-nourished.   HENT:   Head: Normocephalic and atraumatic.   Right Ear: Hearing, tympanic membrane, external ear and ear canal normal.   Left Ear: Hearing, tympanic membrane, external ear and ear canal normal.   Nose: Nose normal. Right sinus exhibits no maxillary sinus tenderness and no frontal sinus tenderness. Left sinus exhibits no maxillary sinus tenderness and no frontal sinus tenderness.   Mouth/Throat: Uvula is midline, oropharynx is clear and moist and mucous membranes are normal.   Eyes: Pupils are equal, round, and reactive to light. Conjunctivae, EOM and lids are normal.   Neck: Trachea normal, normal range of motion and phonation normal. Neck supple. No edema present. No thyroid mass and no thyromegaly present.   Cardiovascular: Normal rate, regular rhythm, S1 normal, S2 normal, normal heart sounds, intact distal pulses and normal pulses.    Pulmonary/Chest: Effort normal and breath sounds normal.   Abdominal: Soft. Normal appearance, " normal aorta and bowel sounds are normal. There is no hepatomegaly. There is no tenderness.   Lymphadenopathy:     He has no cervical adenopathy.   Neurological: He is alert and oriented to person, place, and time. He has normal reflexes.   Skin: Skin is warm, dry and intact. Capillary refill takes less than 2 seconds. Rash noted. There is erythema.   Butterfly rash on B/L cheeks noted.  Rash is slightly  Rough to touch.   Psychiatric: He has a normal mood and affect. His speech is normal and behavior is normal. Judgment and thought content normal. Cognition and memory are normal.       Assessment/Plan   Donald was seen today for dry eyes and fatigue.    Diagnoses and all orders for this visit:    Chronic fatigue  -     CBC & Differential  -     Comprehensive Metabolic Panel  -     Thyroid Panel With TSH  -     Rheumatoid Factor  -     Vitamin B12  -     C-reactive Protein  -     Testosterone (Free & Total), LC / MS    Facial rash  -     CBC & Differential  -     Comprehensive Metabolic Panel  -     Rheumatoid Factor  -     C-reactive Protein  -     GAYLE    High risk medication use  -     CBC & Differential  -     Comprehensive Metabolic Panel  -     Thyroid Panel With TSH  -     C-reactive Protein  -     GAYLE    Arthralgia, unspecified joint  -     CBC & Differential  -     Comprehensive Metabolic Panel  -     Rheumatoid Factor  -     C-reactive Protein  -     GAYLE        Mr. Donald Carter was in office today with several complaints.  He has had chronic fatigue, facial rash and chronic arthralgias.  He has had dry eyes with chronic fatigue for several  months.  Asked him to keep his follow-up appointments with his ophthalmologist.  We will rule out intercurrent versus rheumatological causes for his symptoms..    He has a strong family history of lupus.  Donald will have a CBC, CMP, thyroid profile with TSH, rheumatoid factor, GAYLE, vitamin B12, free/total testosterone and a CRP collected for further evaluation of his  symptoms.  States his insurance will not cover for a vitamin D level.  He has had this checked in past and had to argue to get it covered.  I'll hold vitamin D for now.  We'll hold medication for now until lab work is completed.

## 2018-08-27 LAB
ALBUMIN SERPL-MCNC: 4.6 G/DL (ref 3.5–5.2)
ALBUMIN/GLOB SERPL: 1.8 G/DL
ALP SERPL-CCNC: 75 U/L (ref 40–129)
ALT SERPL-CCNC: 20 U/L (ref 5–41)
ANA SER QL: NEGATIVE
AST SERPL-CCNC: 18 U/L (ref 5–40)
BASOPHILS # BLD AUTO: 0.01 10*3/MM3 (ref 0–0.2)
BASOPHILS NFR BLD AUTO: 0.2 % (ref 0–2)
BILIRUB SERPL-MCNC: 0.2 MG/DL (ref 0.2–1.2)
BUN SERPL-MCNC: 10 MG/DL (ref 6–20)
BUN/CREAT SERPL: 11.4 (ref 7–25)
CALCIUM SERPL-MCNC: 9.2 MG/DL (ref 8.6–10.5)
CHLORIDE SERPL-SCNC: 97 MMOL/L (ref 98–107)
CO2 SERPL-SCNC: 27.4 MMOL/L (ref 22–29)
CREAT SERPL-MCNC: 0.88 MG/DL (ref 0.76–1.27)
CRP SERPL-MCNC: 0.05 MG/DL (ref 0–0.5)
EOSINOPHIL # BLD AUTO: 0 10*3/MM3 (ref 0.1–0.3)
EOSINOPHIL NFR BLD AUTO: 0 % (ref 0–4)
ERYTHROCYTE [DISTWIDTH] IN BLOOD BY AUTOMATED COUNT: 12.1 % (ref 11.5–14.5)
FT4I SERPL CALC-MCNC: 1.3 (ref 1.2–4.9)
GLOBULIN SER CALC-MCNC: 2.5 GM/DL
GLUCOSE SERPL-MCNC: 87 MG/DL (ref 65–99)
HCT VFR BLD AUTO: 41.3 % (ref 42–52)
HGB BLD-MCNC: 14 G/DL (ref 14–18)
IMM GRANULOCYTES # BLD: 0.01 10*3/MM3 (ref 0–0.03)
IMM GRANULOCYTES NFR BLD: 0.2 % (ref 0–0.5)
LYMPHOCYTES # BLD AUTO: 2.32 10*3/MM3 (ref 0.6–4.8)
LYMPHOCYTES NFR BLD AUTO: 44.6 % (ref 20–45)
MCH RBC QN AUTO: 31.5 PG (ref 27–31)
MCHC RBC AUTO-ENTMCNC: 33.9 G/DL (ref 31–37)
MCV RBC AUTO: 92.8 FL (ref 80–94)
MONOCYTES # BLD AUTO: 0.38 10*3/MM3 (ref 0–1)
MONOCYTES NFR BLD AUTO: 7.3 % (ref 3–8)
NEUTROPHILS # BLD AUTO: 2.48 10*3/MM3 (ref 1.5–8.3)
NEUTROPHILS NFR BLD AUTO: 47.7 % (ref 45–70)
NRBC BLD AUTO-RTO: 0 /100 WBC (ref 0–0)
PLATELET # BLD AUTO: 321 10*3/MM3 (ref 140–500)
POTASSIUM SERPL-SCNC: 4.6 MMOL/L (ref 3.5–5.2)
PROT SERPL-MCNC: 7.1 G/DL (ref 6–8.5)
RBC # BLD AUTO: 4.45 10*6/MM3 (ref 4.7–6.1)
RHEUMATOID FACT SERPL-ACNC: <10 IU/ML (ref 0–13.9)
SODIUM SERPL-SCNC: 135 MMOL/L (ref 136–145)
T3RU NFR SERPL: 22 % (ref 24–39)
T4 SERPL-MCNC: 5.7 UG/DL (ref 4.5–12)
TESTOST FREE SERPL-MCNC: 9.5 PG/ML (ref 6.8–21.5)
TESTOST SERPL-MCNC: 603.7 NG/DL (ref 264–916)
TSH SERPL DL<=0.005 MIU/L-ACNC: 1.81 UIU/ML (ref 0.45–4.5)
VIT B12 SERPL-MCNC: 946 PG/ML
WBC # BLD AUTO: 5.2 10*3/MM3 (ref 4.8–10.8)

## 2018-08-31 ENCOUNTER — OFFICE VISIT (OUTPATIENT)
Dept: SURGERY | Facility: CLINIC | Age: 48
End: 2018-08-31

## 2018-08-31 DIAGNOSIS — R10.31 RIGHT LOWER QUADRANT ABDOMINAL PAIN: Primary | ICD-10-CM

## 2018-08-31 PROCEDURE — 99024 POSTOP FOLLOW-UP VISIT: CPT | Performed by: SURGERY

## 2018-09-10 NOTE — PROGRESS NOTES
CHIEF COMPLAINT:   Chief Complaint   Patient presents with   • Post-op Follow-up     2nd post-op laparoscopic appendectomy 6/22/18       HISTORY OF PRESENT ILLNESS:  This is a 48 y.o. male who returns to see me today for evaluation of recurrent RLQ abdominal pain that began about 3 weeks ago.  He says it feels exactly like the pain he had before his appendectomy.  His appendix was extremely difficult to remove and fractured in half during the dissection.  I did  him that he would be a higher risk for development of the right lower quadrant abscess, but when he returned to see me for his 2 week follow-up appointment he felt wonderful with no abnormal symptoms.  The pain recurred about 3 weeks ago with radiation to his back and has been increasing and not only the frequency of the painful attacks but also the severity of the attacks.  He also has complained of fatigue, facial flushing, and constipation.  He has been taking a fiber supplement in the last few days for his constipation, but noticed no relief.  He denies any fevers or chills and had nausea only one day which very quickly dissipated.     Pathology:   APPENDIX, APPENDECTOMY:               FOCAL MUCOSAL ACUTE INFLAMMATION.               APPENDICEAL LUMEN WITH FECALITH.               NO EVIDENCE OF TRANSMURAL ACUTE INFLAMMATION.               FIBROUS OBLITERATION OF THE LUMEN NEAR THE DISTAL TIP.               SEROSAL FIBROUS ADHESIONS.    PHYSICAL EXAM:  Lungs: Clear  Heart: RRR  ABD: Incisions are healing well without any erythema or signs of infection.  Ext: no significant edema, calves nontender    A/P:  This is a 48 y.o. male patient who is S/P laparoscopic appendectomy with recurrent right lower quadrant abdominal pain of unknown etiology    His pain may simply be related to his worsening constipation, which is stretching the staple line along the cecum.  I advised him to take MiraLAX and then to consider mag citrate if the MiraLAX does not  elicit a solid bowel movement.  I think if his constipation improves, his pain will improve.  However, given the significant difficulty associated with removal of his appendix I am concerned he could have developed a chronic postoperative abscess which is only now becoming symptomatic.  I would like to check a CT of the abdomen/pelvis on him to rule out this possibility and will call him with the results.    Haydee Cortez MD  General and Endoscopic Surgery  [facility]    [address], [address]  [place], [place], [address]  P: [phone]  F: [phone]

## 2018-09-13 ENCOUNTER — APPOINTMENT (OUTPATIENT)
Dept: CT IMAGING | Facility: HOSPITAL | Age: 48
End: 2018-09-13
Attending: SURGERY

## 2018-09-14 ENCOUNTER — HOSPITAL ENCOUNTER (OUTPATIENT)
Dept: CT IMAGING | Facility: HOSPITAL | Age: 48
Discharge: HOME | End: 2018-09-14
Attending: SURGERY | Admitting: SURGERY

## 2018-09-14 DIAGNOSIS — R10.31 RIGHT LOWER QUADRANT ABDOMINAL PAIN: ICD-10-CM

## 2018-09-14 PROCEDURE — 74177 CT ABD & PELVIS W/CONTRAST: CPT

## 2018-09-14 PROCEDURE — 25010000002 IOPAMIDOL 61 % SOLUTION: Performed by: SURGERY

## 2018-09-14 RX ADMIN — IOPAMIDOL 85 ML: 612 INJECTION, SOLUTION INTRAVENOUS at 08:47

## 2018-09-29 DIAGNOSIS — F41.9 ANXIETY: ICD-10-CM

## 2018-10-01 RX ORDER — HYDROXYZINE HYDROCHLORIDE 25 MG/1
TABLET, FILM COATED ORAL
Qty: 30 TABLET | Refills: 0 | Status: SHIPPED | OUTPATIENT
Start: 2018-10-01 | End: 2018-11-04 | Stop reason: SDUPTHER

## 2018-11-04 DIAGNOSIS — F41.9 ANXIETY: ICD-10-CM

## 2018-11-05 RX ORDER — HYDROXYZINE HYDROCHLORIDE 25 MG/1
TABLET, FILM COATED ORAL
Qty: 30 TABLET | Refills: 0 | Status: SHIPPED | OUTPATIENT
Start: 2018-11-05 | End: 2018-12-10 | Stop reason: SDUPTHER

## 2018-11-13 RX ORDER — CYCLOBENZAPRINE HCL 5 MG
5 TABLET ORAL 2 TIMES DAILY PRN
Qty: 60 TABLET | Refills: 2 | Status: SHIPPED | OUTPATIENT
Start: 2018-11-13 | End: 2019-03-22

## 2018-12-10 DIAGNOSIS — F41.9 ANXIETY: ICD-10-CM

## 2018-12-11 RX ORDER — HYDROXYZINE HYDROCHLORIDE 25 MG/1
TABLET, FILM COATED ORAL
Qty: 30 TABLET | Refills: 0 | Status: SHIPPED | OUTPATIENT
Start: 2018-12-11 | End: 2019-02-19 | Stop reason: SDUPTHER

## 2019-01-10 RX ORDER — CYCLOBENZAPRINE HCL 5 MG
TABLET ORAL
Qty: 60 TABLET | Refills: 5 | Status: SHIPPED | OUTPATIENT
Start: 2019-01-10

## 2019-01-17 ENCOUNTER — OFFICE VISIT (OUTPATIENT)
Dept: GASTROENTEROLOGY | Facility: CLINIC | Age: 49
End: 2019-01-17

## 2019-01-17 VITALS
BODY MASS INDEX: 25.13 KG/M2 | WEIGHT: 206.4 LBS | HEIGHT: 76 IN | DIASTOLIC BLOOD PRESSURE: 82 MMHG | TEMPERATURE: 97.7 F | SYSTOLIC BLOOD PRESSURE: 118 MMHG

## 2019-01-17 DIAGNOSIS — R10.31 RIGHT LOWER QUADRANT ABDOMINAL PAIN: Primary | ICD-10-CM

## 2019-01-17 DIAGNOSIS — K59.00 CONSTIPATION, UNSPECIFIED CONSTIPATION TYPE: ICD-10-CM

## 2019-01-17 PROCEDURE — 99214 OFFICE O/P EST MOD 30 MIN: CPT | Performed by: INTERNAL MEDICINE

## 2019-01-17 NOTE — PATIENT INSTRUCTIONS
IB Lina up to 3 times a day for pain    Linzess every morning for constipation    Schedule the small bowel follow through test    For any additional questions, concerns or changes to your condition after today's office visit please contact the office at [phone].

## 2019-01-17 NOTE — PROGRESS NOTES
Chief Complaint   Patient presents with   • Follow-up   • Abdominal Pain       Subjective     HPI    Donald Carter is a 48 y.o. male with a past medical history noted below who presents for evaluation of RLQ, bloating and GERD.  Imaging showed an abnormal appendix and he had a lap appy with Dr Cortez in June.    He persists with abdominal pain.  Still in the RLQ. Followed up with Dr Cortez, repeat CT scan was normal.   Finds that this is associated more with when he is not having BMs.  Used to be regular with a daily BM, now averaging 3-4 BMs per week.  Taking a fiber supplement-- just started this.  Tried miralax but found that he had to take large amounts to have BMs.    His appendix specimen showed inflammation and serosal adhesions.      Past Medical History:   Diagnosis Date   • Appendicitis    • Gastritis    • GERD (gastroesophageal reflux disease)    • Kidney stones    • Occipital neuralgia    • Trigeminal neuralgia          Current Outpatient Medications:   •  carBAMazepine (TEGretol) 200 MG tablet, Take 2 tablets by mouth 2 (Two) Times a Day., Disp: 360 tablet, Rfl: 3  •  cyclobenzaprine (FLEXERIL) 5 MG tablet, Take 1 tablet by mouth 2 (Two) Times a Day As Needed for Muscle Spasms., Disp: 60 tablet, Rfl: 2  •  cyclobenzaprine (FLEXERIL) 5 MG tablet, TAKE 1 TABLET BY MOUTH TWICE DAILY AS NEEDED FOR MUSCLE SPASMS, Disp: 60 tablet, Rfl: 5  •  FIBER PO, Take 1 tablet by mouth Daily., Disp: , Rfl:   •  hydrOXYzine (ATARAX) 25 MG tablet, TAKE 1 TABLET BY MOUTH EVERY 8 HOURS IF NEEDED FOR ITCHING OR ANXIETY, Disp: 30 tablet, Rfl: 0  •  diclofenac (VOLTAREN) 50 MG EC tablet, Take 50 mg by mouth 2 (Two) Times a Day., Disp: , Rfl:   •  fluticasone (FLONASE) 50 MCG/ACT nasal spray, 2 sprays into each nostril Daily., Disp: , Rfl:   •  loratadine (CLARITIN) 10 MG tablet, Take 10 mg by mouth Daily., Disp: , Rfl:   •  Omeprazole Magnesium (PRILOSEC OTC PO), Take 20 mg by mouth Daily., Disp: , Rfl:     Allergies    Allergen Reactions   • Oxycodone Other (See Comments)     Makes hyperactive       Social History     Socioeconomic History   • Marital status:      Spouse name: Not on file   • Number of children: Not on file   • Years of education: Not on file   • Highest education level: Not on file   Social Needs   • Financial resource strain: Not on file   • Food insecurity - worry: Not on file   • Food insecurity - inability: Not on file   • Transportation needs - medical: Not on file   • Transportation needs - non-medical: Not on file   Occupational History   • Not on file   Tobacco Use   • Smoking status: Never Smoker   • Smokeless tobacco: Never Used   Substance and Sexual Activity   • Alcohol use: No   • Drug use: No   • Sexual activity: Yes     Partners: Female   Other Topics Concern   • Not on file   Social History Narrative   • Not on file       Family History   Problem Relation Age of Onset   • Hypertension Mother    • Hyperlipidemia Mother    • Hypertension Father    • Hyperlipidemia Father    • No Known Problems Brother    • No Known Problems Daughter    • No Known Problems Son    • Lupus Paternal Grandmother    • COPD Paternal Grandfather    • Malig Hyperthermia Neg Hx        Review of Systems   Constitutional: Negative for activity change, appetite change, chills and fever.   HENT: Negative for trouble swallowing.    Respiratory: Negative.    Cardiovascular: Negative.  Negative for chest pain.   Gastrointestinal: Positive for abdominal pain and constipation. Negative for abdominal distention, anal bleeding, diarrhea, nausea and vomiting.   Genitourinary: Negative for dysuria, frequency and hematuria.       Objective     Vitals:    01/17/19 0808   BP: 118/82   Temp: 97.7 °F (36.5 °C)         01/17/19  0808   Weight: 93.6 kg (206 lb 6.4 oz)     Body mass index is 25.12 kg/m².    Physical Exam   Constitutional: He is oriented to person, place, and time. He appears well-developed and well-nourished. No  distress.   HENT:   Head: Normocephalic and atraumatic.   Right Ear: External ear normal.   Left Ear: External ear normal.   Nose: Nose normal.   Mouth/Throat: Oropharynx is clear and moist.   Eyes: Conjunctivae and EOM are normal. Right eye exhibits no discharge. Left eye exhibits no discharge. No scleral icterus.   Neck: Normal range of motion. Neck supple. No thyromegaly present.   No supraclavicular adenopathy   Cardiovascular: Normal rate, regular rhythm, normal heart sounds and intact distal pulses. Exam reveals no gallop.   No murmur heard.  No lower extremity edema   Pulmonary/Chest: Effort normal and breath sounds normal. No respiratory distress. He has no wheezes.   Abdominal: Soft. Normal appearance and bowel sounds are normal. He exhibits no distension and no mass. There is no hepatosplenomegaly. There is tenderness. There is no rigidity, no rebound and no guarding. No hernia.   Tender RLQ   Genitourinary:   Genitourinary Comments: Rectal exam deferred   Musculoskeletal: Normal range of motion. He exhibits no edema or tenderness.   No atrophy of upper or lower extremities.  Normal digits and nails of both hands.   Lymphadenopathy:     He has no cervical adenopathy.   Neurological: He is alert and oriented to person, place, and time. He displays no atrophy. Coordination normal.   Skin: Skin is warm and dry. No rash noted. He is not diaphoretic. No erythema.   Psychiatric: He has a normal mood and affect. His behavior is normal. Judgment and thought content normal.   Vitals reviewed.      WBC   Date Value Ref Range Status   08/24/2018 5.20 4.80 - 10.80 10*3/mm3 Final     RBC   Date Value Ref Range Status   08/24/2018 4.45 (L) 4.70 - 6.10 10*6/mm3 Final     Hemoglobin   Date Value Ref Range Status   08/24/2018 14.0 14.0 - 18.0 g/dL Final   06/20/2018 12.8 (L) 13.7 - 17.6 g/dL Final     Hematocrit   Date Value Ref Range Status   08/24/2018 41.3 (L) 42.0 - 52.0 % Final   06/20/2018 37.8 (L) 40.4 - 52.2 %  Final     MCV   Date Value Ref Range Status   08/24/2018 92.8 80.0 - 94.0 fL Final   06/20/2018 91.3 79.8 - 96.2 fL Final     MCH   Date Value Ref Range Status   08/24/2018 31.5 (H) 27.0 - 31.0 pg Final   06/20/2018 30.9 27.0 - 32.7 pg Final     MCHC   Date Value Ref Range Status   08/24/2018 33.9 31.0 - 37.0 g/dL Final   06/20/2018 33.9 32.6 - 36.4 g/dL Final     RDW   Date Value Ref Range Status   08/24/2018 12.1 11.5 - 14.5 % Final   06/20/2018 12.0 11.5 - 14.5 % Final     RDW-SD   Date Value Ref Range Status   06/20/2018 40.0 37.0 - 54.0 fl Final     MPV   Date Value Ref Range Status   06/20/2018 9.0 6.0 - 12.0 fL Final     Platelets   Date Value Ref Range Status   08/24/2018 321 140 - 500 10*3/mm3 Final   06/20/2018 250 140 - 500 10*3/mm3 Final     Neutrophil Rel %   Date Value Ref Range Status   08/24/2018 47.7 45.0 - 70.0 % Final     Lymphocyte Rel %   Date Value Ref Range Status   08/24/2018 44.6 20.0 - 45.0 % Final     Monocyte Rel %   Date Value Ref Range Status   08/24/2018 7.3 3.0 - 8.0 % Final     Eosinophil Rel %   Date Value Ref Range Status   08/24/2018 0.0 0.0 - 4.0 % Final     Basophil Rel %   Date Value Ref Range Status   08/24/2018 0.2 0.0 - 2.0 % Final     Neutrophils Absolute   Date Value Ref Range Status   08/24/2018 2.48 1.50 - 8.30 10*3/mm3 Final     Lymphocytes Absolute   Date Value Ref Range Status   08/24/2018 2.32 0.60 - 4.80 10*3/mm3 Final     Monocytes Absolute   Date Value Ref Range Status   08/24/2018 0.38 0.00 - 1.00 10*3/mm3 Final     Eosinophils Absolute   Date Value Ref Range Status   08/24/2018 0.00 (L) 0.10 - 0.30 10*3/mm3 Final     Basophils Absolute   Date Value Ref Range Status   08/24/2018 0.01 0.00 - 0.20 10*3/mm3 Final     nRBC   Date Value Ref Range Status   08/24/2018 0.0 0.0 - 0.0 /100 WBC Final       Glucose   Date Value Ref Range Status   06/20/2018 96 65 - 99 mg/dL Final     Sodium   Date Value Ref Range Status   08/24/2018 135 (L) 136 - 145 mmol/L Final    06/20/2018 123 (L) 136 - 145 mmol/L Final     Potassium   Date Value Ref Range Status   08/24/2018 4.6 3.5 - 5.2 mmol/L Final   06/20/2018 3.7 3.5 - 5.2 mmol/L Final     CO2   Date Value Ref Range Status   06/20/2018 23.8 22.0 - 29.0 mmol/L Final     Total CO2   Date Value Ref Range Status   08/24/2018 27.4 22.0 - 29.0 mmol/L Final     Chloride   Date Value Ref Range Status   08/24/2018 97 (L) 98 - 107 mmol/L Final   06/20/2018 89 (L) 98 - 107 mmol/L Final     Anion Gap   Date Value Ref Range Status   06/20/2018 10.2 mmol/L Final     Creatinine   Date Value Ref Range Status   08/24/2018 0.88 0.76 - 1.27 mg/dL Final   06/20/2018 0.86 0.76 - 1.27 mg/dL Final     BUN   Date Value Ref Range Status   08/24/2018 10 6 - 20 mg/dL Final   06/20/2018 10 6 - 20 mg/dL Final     BUN/Creatinine Ratio   Date Value Ref Range Status   08/24/2018 11.4 7.0 - 25.0 Final   06/20/2018 11.6 7.0 - 25.0 Final     Calcium   Date Value Ref Range Status   08/24/2018 9.2 8.6 - 10.5 mg/dL Final   06/20/2018 8.6 8.6 - 10.5 mg/dL Final     eGFR Non  Amer   Date Value Ref Range Status   06/20/2018 96 >60 mL/min/1.73 Final     eGFR Non  Am   Date Value Ref Range Status   08/24/2018 93 >60 mL/min/1.73 Final     Alkaline Phosphatase   Date Value Ref Range Status   08/24/2018 75 40 - 129 U/L Final     ALT (SGPT)   Date Value Ref Range Status   08/24/2018 20 5 - 41 U/L Final     AST (SGOT)   Date Value Ref Range Status   08/24/2018 18 5 - 40 U/L Final     Total Bilirubin   Date Value Ref Range Status   08/24/2018 0.2 0.2 - 1.2 mg/dL Final     Albumin   Date Value Ref Range Status   08/24/2018 4.60 3.50 - 5.20 g/dL Final     A/G Ratio   Date Value Ref Range Status   08/24/2018 1.8 g/dL Final       CT abd    CT ABDOMEN PELVIS WITH CONTRAST-     CLINICAL:Recurrent right lower quadrant pain following lap appy   Dx: Right lower quadrant abdominal pain      Radiation dose reduction techniques were utilized, including automated  exposure  control and exposure modulation based on body size.     COMPARISON: 05/31/2018.     FINDINGS: Interval appendectomy with a suture line at the cecal base.  The cecum and surrounding mesenteric fat is normal in appearance. The  terminal ileum is satisfactory in appearance. No abnormal fluid  collection to suggest seroma, hematoma or abscess. The colon, small  bowel, bladder, prostate and seminal vesicles are normal. No free  intraperitoneal air. Right and left inguinal canals are normal.     The liver, gallbladder, pancreas, spleen, adrenal glands and stomach are  normal. No biliary duct dilatation. Both kidneys demonstrate asymmetric  satisfactory pattern of enhancement without mass, calculus or  obstructive uropathy. The caliber of the aorta is normal.     CONCLUSION: Normal CT scan of the abdomen and pelvis status post  appendectomy. Special attention to the right lower quadrant operative  site.     This report was finalized on 9/14/2018 3:21 PM by Dr. Oni Sidhu M.D.      No notes on file    Assessment/Plan    RLQ pain: persists despite removal of inflamed appendix.  ? Due to adhesions, constipation, other    Constipation: new issue since his surgery, only slightly better with fiber, miralax    Plan  Small bowel follow through to assess TI, adhesions  Samples of linzess 72mcg given to try to see if this helps constipation  IB Lina samples  Consider colonoscopy if not improving    Donald was seen today for follow-up and abdominal pain.    Diagnoses and all orders for this visit:    Right lower quadrant abdominal pain  -     FL small bowel follow through; Future    Constipation, unspecified constipation type        I have discussed the above plan with the patient.  They verbalize understanding and are in agreement with the plan.  They have been advised to contact the office for any questions, concerns, or changes related to their health.    Dictated utilizing Dragon dictation

## 2019-02-05 ENCOUNTER — APPOINTMENT (OUTPATIENT)
Dept: GENERAL RADIOLOGY | Facility: HOSPITAL | Age: 49
End: 2019-02-05
Attending: INTERNAL MEDICINE

## 2019-02-19 DIAGNOSIS — F41.9 ANXIETY: ICD-10-CM

## 2019-02-20 RX ORDER — HYDROXYZINE HYDROCHLORIDE 25 MG/1
TABLET, FILM COATED ORAL
Qty: 30 TABLET | Refills: 0 | Status: SHIPPED | OUTPATIENT
Start: 2019-02-20 | End: 2019-04-14 | Stop reason: SDUPTHER

## 2019-03-18 ENCOUNTER — TELEPHONE (OUTPATIENT)
Dept: GASTROENTEROLOGY | Facility: CLINIC | Age: 49
End: 2019-03-18

## 2019-03-18 NOTE — TELEPHONE ENCOUNTER
We can either go ahead and order the small bowel follow-through or we can arrange for a colonoscopy allowing direct visualization and biopsy of the ileocecal valve.  Either one is a reasonable option but the colonoscopy is a little more involved

## 2019-03-18 NOTE — TELEPHONE ENCOUNTER
Regarding: Non-Urgent Medical Question  Contact: [phone]  ----- Message from Mychart, Generic sent at 3/17/2019  7:29 PM EDT -----    Dr. Mijares,    I wanted to touch base from my last visit. I did not go ahead with the follow through study after my last appt. due to a number of things. However, I am still having a lot of issues with discomfort in my lower right abdomen, as well as pain, constipation, etc. I took all the medicine that you gave me but it did not help. So, at this point would you like me to go ahead with that study? If so do you need to resend the order so I can make the appointment?     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 Could all of these symptoms be related to a problem with that valve?    Let me know when you can.    Donald

## 2019-03-20 NOTE — TELEPHONE ENCOUNTER
Called pt and advised per Dr Mijares that we can go ahead and order the sbft or we can arrange for a c/s allowing direct visualization and bx of the ileocecal valve.  Either one is a resonable option but the c/s is a little more involved.      Pt verb understanding and wants to speak with his wife and he will call us back with his decision.

## 2019-03-22 ENCOUNTER — OFFICE VISIT (OUTPATIENT)
Dept: FAMILY MEDICINE CLINIC | Facility: CLINIC | Age: 49
End: 2019-03-22

## 2019-03-22 VITALS
BODY MASS INDEX: 24.6 KG/M2 | HEART RATE: 77 BPM | RESPIRATION RATE: 16 BRPM | OXYGEN SATURATION: 99 % | WEIGHT: 202 LBS | HEIGHT: 76 IN | TEMPERATURE: 98.4 F | SYSTOLIC BLOOD PRESSURE: 110 MMHG | DIASTOLIC BLOOD PRESSURE: 77 MMHG

## 2019-03-22 DIAGNOSIS — D48.5 NEOPLASM OF UNCERTAIN BEHAVIOR OF SKIN OF UPPER ARM: Primary | ICD-10-CM

## 2019-03-22 PROCEDURE — 99213 OFFICE O/P EST LOW 20 MIN: CPT | Performed by: PHYSICIAN ASSISTANT

## 2019-03-22 NOTE — PROGRESS NOTES
"Subjective   Donald Carter is a 48 y.o. male presents for   Chief Complaint   Patient presents with   • Suspicious Skin Lesion     color changes       History of Present Illness     Donald is a 48-year-old male who presents with changing mole on right upper arm area over the past several weeks.  States he has noticed the mole/lesion has increased in size and shape.  It is now raised off of his skin.  He has noticed the color has changed from tan to pink-red.  He has not seen a dermatologist in the past.  No family history of skin cancer that he knows of.    The following portions of the patient's history were reviewed and updated as appropriate: allergies, current medications, past family history, past medical history, past social history, past surgical history and problem list.    Review of Systems   Constitutional: Negative.    HENT: Negative.    Eyes: Negative.    Respiratory: Negative.    Cardiovascular: Negative.    Gastrointestinal: Negative.    Endocrine: Negative.    Genitourinary: Negative.    Musculoskeletal: Negative.    Skin: Negative.         Changing mole     Allergic/Immunologic: Negative.    Neurological: Negative.    Hematological: Negative.    Psychiatric/Behavioral: Negative.    All other systems reviewed and are negative.        Vitals:    03/22/19 0803   BP: 110/77   Pulse: 77   Resp: 16   Temp: 98.4 °F (36.9 °C)   SpO2: 99%   Weight: 91.6 kg (202 lb)   Height: 193 cm (76\")     Wt Readings from Last 3 Encounters:   03/22/19 91.6 kg (202 lb)   01/17/19 93.6 kg (206 lb 6.4 oz)   08/24/18 92.5 kg (204 lb)     BP Readings from Last 3 Encounters:   03/22/19 110/77   01/17/19 118/82   08/24/18 126/74     Social History     Socioeconomic History   • Marital status:      Spouse name: Not on file   • Number of children: Not on file   • Years of education: Not on file   • Highest education level: Not on file   Tobacco Use   • Smoking status: Never Smoker   • Smokeless tobacco: Never Used   Substance " and Sexual Activity   • Alcohol use: No   • Drug use: No   • Sexual activity: Yes     Partners: Female       Allergies   Allergen Reactions   • Oxycodone Other (See Comments)     Makes hyperactive       Body mass index is 24.59 kg/m².    Objective   Physical Exam   Skin: Skin is warm, dry and intact. Capillary refill takes less than 2 seconds. Lesion noted.            Assessment/Plan   Donald was seen today for suspicious skin lesion.    Diagnoses and all orders for this visit:    Neoplasm of uncertain behavior of skin of upper arm  -     Ambulatory Referral to Dermatology      Mr. Donald Carter was in office today with new and changing neoplasm of right upper arm.  I suspect this may be a squamous cell versus basal cell skin cancer.  Will send to dermatologist, Dr. Beal's group for further evaluation and treatment.  He will schedule his own appointment and update office with appointment time and date.  Meanwhile he was instructed to use good sunscreen when he is outside or with sun exposure.  He voiced understanding.    [facility] Ritter [facility]  [address] [place] [id number]  Dept: [phone]  Dept Fax: [phone]  Loc: [phone]  Loc Fax: [phone]

## 2019-03-25 ENCOUNTER — TELEPHONE (OUTPATIENT)
Dept: GASTROENTEROLOGY | Facility: CLINIC | Age: 49
End: 2019-03-25

## 2019-03-25 NOTE — TELEPHONE ENCOUNTER
"Regarding: Non-Urgent Medical Question  Contact: [phone]  ----- Message from Mychart, Generic sent at 3/25/2019 11:48 AM EDT -----    Dr. Mijares,    I spoke with Pearl last week and she relayed your message about the tests. Thank you for getting back to me! 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 Last week, I made more diet changes so that I am now doing primarily rice, sweet potatoes, chicken, fish, turkey, cooked vegetables and only \"soluble fibers\" mostly related to fruits I am eating. No chips, chocolate, nuts, popcorn, soy, very little caffeine and nothing fried. All that to say, I am feeling much better, like a 80% improvement in the last 5 days. So, I am going to stick with this for a couple of weeks and see how it goes. Thanks. Donald  "

## 2019-04-14 DIAGNOSIS — F41.9 ANXIETY: ICD-10-CM

## 2019-04-15 RX ORDER — HYDROXYZINE HYDROCHLORIDE 25 MG/1
TABLET, FILM COATED ORAL
Qty: 30 TABLET | Refills: 0 | Status: SHIPPED | OUTPATIENT
Start: 2019-04-15 | End: 2019-07-25 | Stop reason: SDUPTHER

## 2019-07-25 DIAGNOSIS — F41.9 ANXIETY: ICD-10-CM

## 2019-07-25 RX ORDER — HYDROXYZINE HYDROCHLORIDE 25 MG/1
25 TABLET, FILM COATED ORAL EVERY 8 HOURS PRN
Qty: 30 TABLET | Refills: 0 | Status: SHIPPED | OUTPATIENT
Start: 2019-07-25 | End: 2019-08-30 | Stop reason: SDUPTHER

## 2019-08-18 DIAGNOSIS — G50.0 TRIGEMINAL NEURALGIA OF RIGHT SIDE OF FACE: ICD-10-CM

## 2019-08-20 RX ORDER — CARBAMAZEPINE 200 MG/1
TABLET ORAL
Qty: 360 TABLET | Refills: 4 | Status: SHIPPED | OUTPATIENT
Start: 2019-08-20 | End: 2020-10-13 | Stop reason: SDUPTHER

## 2019-08-21 NOTE — TELEPHONE ENCOUNTER
I will refill his carbamazepine    He should set up a follow-up with a nurse practitioner    IMANI

## 2019-08-29 DIAGNOSIS — F41.9 ANXIETY: ICD-10-CM

## 2019-08-29 RX ORDER — HYDROXYZINE 50 MG/1
TABLET, FILM COATED ORAL
Qty: 15 TABLET | Refills: 0 | OUTPATIENT
Start: 2019-08-29

## 2019-08-30 DIAGNOSIS — F41.9 ANXIETY: ICD-10-CM

## 2019-08-30 RX ORDER — HYDROXYZINE HYDROCHLORIDE 25 MG/1
25 TABLET, FILM COATED ORAL EVERY 8 HOURS PRN
Qty: 30 TABLET | Refills: 0 | Status: SHIPPED | OUTPATIENT
Start: 2019-08-30 | End: 2019-10-25 | Stop reason: SDUPTHER

## 2019-09-26 ENCOUNTER — OFFICE VISIT (OUTPATIENT)
Dept: NEUROLOGY | Facility: CLINIC | Age: 49
End: 2019-09-26

## 2019-09-26 VITALS
WEIGHT: 200 LBS | HEIGHT: 76 IN | HEART RATE: 75 BPM | BODY MASS INDEX: 24.36 KG/M2 | SYSTOLIC BLOOD PRESSURE: 118 MMHG | DIASTOLIC BLOOD PRESSURE: 72 MMHG | OXYGEN SATURATION: 98 %

## 2019-09-26 DIAGNOSIS — G50.0 TRIGEMINAL NEURALGIA OF RIGHT SIDE OF FACE: Primary | ICD-10-CM

## 2019-09-26 PROCEDURE — 99213 OFFICE O/P EST LOW 20 MIN: CPT | Performed by: PSYCHIATRY & NEUROLOGY

## 2019-09-26 NOTE — PROGRESS NOTES
CC: Trigeminal neuralgia right V2 segment    HPI:  Donald Carter is a  49 y.o.  right-handed white male who I am seeing in follow-up regarding right V2 segment trigeminal neuralgia.  I saw him previously 11/9/2017.  He was on carbamazepine 200 mg twice daily with decent control of his neuralgia pain.  He did have some symptoms which suggested occipital neuralgia.  Eventually he saw Michelle Villafuerte for bilateral occipital nerve blocks.  He states that the effect was not immediate but eventually his neck and occipital headaches improved but he says recently the neck pain has been doing well.  About 2 months ago he indicates his trigeminal nerve pain increased dramatically.  He had tapered down to just 1 tablet daily.  He escalated the dosage eventually to 4 tablets daily.  The pain was controllable within a few days.  He states that he has had some visual changes in the right eye not completely resolved with refraction.  He does not feel that the onset was abrupt.    Patient had an appendectomy in June of last year.  Review of labs demonstrate his sodium level on 4/9/2018 was 135 but on 6/20/2018 it was low at 123.  (He is not specifically aware of complaints other than fatigue perhaps at the time this was low).  A follow-up on 8/24/2018 was normal at 135.  The first and last dates showed normal AST and ALT and his hemoglobin was 13.3 followed by a 14.0 and white count was 5.9 followed by 5.2.  The patient indicates he has some ileocecal valve problems that are affected by diet with improvement in changing his diet to a more fruit and grain based and non-beef protein.        Past Medical History:   Diagnosis Date   • Appendicitis    • Gastritis    • GERD (gastroesophageal reflux disease)    • Kidney stones    • Occipital neuralgia    • Trigeminal neuralgia          Past Surgical History:   Procedure Laterality Date   • APPENDECTOMY N/A 6/22/2018    Procedure: Laparoscopic appendectomy;  Surgeon: Haydee Cortez MD;   Location: [facility] OR;  Service: General   • TONSILLECTOMY     • UPPER GASTROINTESTINAL ENDOSCOPY      [place]- normal    • WISDOM TOOTH EXTRACTION             Current Outpatient Medications:   •  carBAMazepine (TEGretol) 200 MG tablet, TAKE 2 TABLETS TWICE A DAY, Disp: 360 tablet, Rfl: 4  •  cyclobenzaprine (FLEXERIL) 5 MG tablet, TAKE 1 TABLET BY MOUTH TWICE DAILY AS NEEDED FOR MUSCLE SPASMS, Disp: 60 tablet, Rfl: 5  •  fluticasone (FLONASE) 50 MCG/ACT nasal spray, 2 sprays into each nostril Daily., Disp: , Rfl:   •  hydrOXYzine (ATARAX) 25 MG tablet, Take 1 tablet by mouth Every 8 (Eight) Hours As Needed for Itching., Disp: 30 tablet, Rfl: 0  •  loratadine (CLARITIN) 10 MG tablet, Take 10 mg by mouth Daily., Disp: , Rfl:       Family History   Problem Relation Age of Onset   • Hypertension Mother    • Hyperlipidemia Mother    • Hypertension Father    • Hyperlipidemia Father    • No Known Problems Brother    • No Known Problems Daughter    • No Known Problems Son    • Lupus Paternal Grandmother    • COPD Paternal Grandfather    • Malig Hyperthermia Neg Hx          Social History     Socioeconomic History   • Marital status:      Spouse name: Not on file   • Number of children: Not on file   • Years of education: Not on file   • Highest education level: Not on file   Tobacco Use   • Smoking status: Never Smoker   • Smokeless tobacco: Never Used   Substance and Sexual Activity   • Alcohol use: No   • Drug use: No   • Sexual activity: Yes     Partners: Female         Allergies   Allergen Reactions   • Oxycodone Other (See Comments)     Makes hyperactive         Pain Scale: 0/10        ROS:  Review of Systems   Constitutional: Negative for activity change, appetite change and fatigue.   Eyes: Negative for pain, redness and itching.   Respiratory: Negative for cough, choking and shortness of breath.    Cardiovascular: Negative for chest pain and leg swelling.   Gastrointestinal: Positive for abdominal pain  "(lower right). Negative for nausea and vomiting.   Endocrine: Negative for cold intolerance and heat intolerance.   Skin: Negative for color change, pallor and rash.   Allergic/Immunologic: Negative for environmental allergies and food allergies.   Neurological: Negative for dizziness, tremors, seizures, syncope, facial asymmetry, speech difficulty, weakness, light-headedness, numbness and headaches.   Psychiatric/Behavioral: Negative for agitation, behavioral problems, confusion, decreased concentration, dysphoric mood, hallucinations, self-injury, sleep disturbance and suicidal ideas. The patient is nervous/anxious. The patient is not hyperactive.          I have reviewed and agree with the above ROS completed by the medical assistant.      Physical Exam:  Vitals:    09/26/19 1158   BP: 118/72   Pulse: 75   SpO2: 98%   Weight: 90.7 kg (200 lb)   Height: 193 cm (76\")     Orthostatic BP:    Body mass index is 24.34 kg/m².    Physical Exam  General: Well-developed white male no acute distress  HEENT: Normocephalic no evidence of trauma  Neck: Supple  Heart: Regular rate and rhythm  Extremities: No edema.  Radial pulses strong and simultaneous      Neurological Exam:   Mental Status: Awake, alert, oriented to person, place and time.  Conversant without evidence of an affective disorder, thought disorder, delusions or hallucinations.  Attention span and concentration are normal.  HCF: No aphasia, apraxia or dysarthria.  Recent and remote memory intact.  Knowledge of recent events intact.  CN: I:   II: Visual fields full without left inattention   III, IV, VI: Eye movements intact without nystagmus or ptosis.  Pupils equal round and reactive to light.   V,VII: Light touch and pinprick intact all 3 divisions of V.  Facial muscles symmetrical.   VIII: Hearing intact to finger rub   IX,X: Soft palate elevates symmetrically   XI: Sternomastoid and trapezius are strong.   XII: Tongue midline without atrophy or " fasciculations  Motor: Normal tone and bulk in the upper and lower extremities   Power testing: Full power in all muscles tested arms and legs  Reflexes: Upper extremities: +1 diffuse        Lower extremities: +1 diffusely        Toe signs: Downgoing bilaterally  Sensory: Light touch: Diffusely intact arms and legs        Pinprick: Diffusely intact arms and legs        Vibration: Intact at the ankles        Position: Intact at the great toes    Cerebellar: Finger-to-nose: Normal           Rapid movement: Normal           Heel-to-shin: Normal  Gait and Station: Normal casual, toe, heel and tandem walk.  No Romberg no drift    Results:      Lab Results   Component Value Date    GLUCOSE 96 06/20/2018    BUN 10 08/24/2018    CREATININE 0.88 08/24/2018    EGFRIFNONA 93 08/24/2018    EGFRIFAFRI 113 08/24/2018    BCR 11.4 08/24/2018    CO2 27.4 08/24/2018    CALCIUM 9.2 08/24/2018    PROTENTOTREF 7.1 08/24/2018    ALBUMIN 4.60 08/24/2018    LABIL2 1.8 08/24/2018    AST 18 08/24/2018    ALT 20 08/24/2018       Lab Results   Component Value Date    WBC 5.20 08/24/2018    HGB 14.0 08/24/2018    HCT 41.3 (L) 08/24/2018    MCV 92.8 08/24/2018     08/24/2018         .No results found for: RPR      Lab Results   Component Value Date    TSH 1.810 08/24/2018    W0DFNTG 5.7 08/24/2018         Lab Results   Component Value Date    QMJOZAVW11 946 08/24/2018         No results found for: FOLATE      No results found for: HGBA1C      Lab Results   Component Value Date    GLUCOSE 96 06/20/2018    BUN 10 08/24/2018    CREATININE 0.88 08/24/2018    EGFRIFNONA 93 08/24/2018    EGFRIFAFRI 113 08/24/2018    BCR 11.4 08/24/2018    K 4.6 08/24/2018    CO2 27.4 08/24/2018    CALCIUM 9.2 08/24/2018    PROTENTOTREF 7.1 08/24/2018    ALBUMIN 4.60 08/24/2018    LABIL2 1.8 08/24/2018    AST 18 08/24/2018    ALT 20 08/24/2018         Lab Results   Component Value Date    WBC 5.20 08/24/2018    HGB 14.0 08/24/2018    HCT 41.3 (L) 08/24/2018     MCV 92.8 08/24/2018     08/24/2018             Assessment:   1.  Right V2 trigeminal neuralgia-quiescent on carbamazepine 400 mg twice daily  2.  History of occipital neuralgia currently quiescent          Plan:  1.  Continue carbamazepine 400 mg twice daily  2.  Check carbamazepine level, CBC and CMP-in particular sodium level  3.  Follow-up in 6 months                          Dictated utilizing Dragon dictation.

## 2019-10-18 ENCOUNTER — LAB (OUTPATIENT)
Dept: LAB | Facility: HOSPITAL | Age: 49
End: 2019-10-18

## 2019-10-18 DIAGNOSIS — G50.0 TRIGEMINAL NEURALGIA OF RIGHT SIDE OF FACE: ICD-10-CM

## 2019-10-18 LAB
ALBUMIN SERPL-MCNC: 4.5 G/DL (ref 3.5–5.2)
ALBUMIN/GLOB SERPL: 1.7 G/DL
ALP SERPL-CCNC: 67 U/L (ref 39–117)
ALT SERPL W P-5'-P-CCNC: 25 U/L (ref 1–41)
ANION GAP SERPL CALCULATED.3IONS-SCNC: 10 MMOL/L (ref 5–15)
AST SERPL-CCNC: 19 U/L (ref 1–40)
BASOPHILS # BLD AUTO: 0.02 10*3/MM3 (ref 0–0.2)
BASOPHILS NFR BLD AUTO: 0.4 % (ref 0–1.5)
BILIRUB SERPL-MCNC: 0.5 MG/DL (ref 0.2–1.2)
BUN BLD-MCNC: 8 MG/DL (ref 6–20)
BUN/CREAT SERPL: 8.6 (ref 7–25)
CALCIUM SPEC-SCNC: 9.1 MG/DL (ref 8.6–10.5)
CARBAMAZEPINE SERPL-MCNC: 5.8 MCG/ML (ref 4–12)
CHLORIDE SERPL-SCNC: 91 MMOL/L (ref 98–107)
CO2 SERPL-SCNC: 26 MMOL/L (ref 22–29)
CREAT BLD-MCNC: 0.93 MG/DL (ref 0.76–1.27)
DEPRECATED RDW RBC AUTO: 40.9 FL (ref 37–54)
EOSINOPHIL # BLD AUTO: 0 10*3/MM3 (ref 0–0.4)
EOSINOPHIL NFR BLD AUTO: 0 % (ref 0.3–6.2)
ERYTHROCYTE [DISTWIDTH] IN BLOOD BY AUTOMATED COUNT: 12.1 % (ref 12.3–15.4)
GFR SERPL CREATININE-BSD FRML MDRD: 87 ML/MIN/1.73
GLOBULIN UR ELPH-MCNC: 2.7 GM/DL
GLUCOSE BLD-MCNC: 92 MG/DL (ref 65–99)
HCT VFR BLD AUTO: 39.9 % (ref 37.5–51)
HGB BLD-MCNC: 13.7 G/DL (ref 13–17.7)
IMM GRANULOCYTES # BLD AUTO: 0.02 10*3/MM3 (ref 0–0.05)
IMM GRANULOCYTES NFR BLD AUTO: 0.4 % (ref 0–0.5)
LYMPHOCYTES # BLD AUTO: 2.09 10*3/MM3 (ref 0.7–3.1)
LYMPHOCYTES NFR BLD AUTO: 45.5 % (ref 19.6–45.3)
MCH RBC QN AUTO: 31.9 PG (ref 26.6–33)
MCHC RBC AUTO-ENTMCNC: 34.3 G/DL (ref 31.5–35.7)
MCV RBC AUTO: 93 FL (ref 79–97)
MONOCYTES # BLD AUTO: 0.4 10*3/MM3 (ref 0.1–0.9)
MONOCYTES NFR BLD AUTO: 8.7 % (ref 5–12)
NEUTROPHILS # BLD AUTO: 2.06 10*3/MM3 (ref 1.7–7)
NEUTROPHILS NFR BLD AUTO: 45 % (ref 42.7–76)
NRBC BLD AUTO-RTO: 0 /100 WBC (ref 0–0.2)
PLATELET # BLD AUTO: 361 10*3/MM3 (ref 140–450)
PMV BLD AUTO: 8.6 FL (ref 6–12)
POTASSIUM BLD-SCNC: 4.5 MMOL/L (ref 3.5–5.2)
PROT SERPL-MCNC: 7.2 G/DL (ref 6–8.5)
RBC # BLD AUTO: 4.29 10*6/MM3 (ref 4.14–5.8)
SODIUM BLD-SCNC: 127 MMOL/L (ref 136–145)
WBC NRBC COR # BLD: 4.59 10*3/MM3 (ref 3.4–10.8)

## 2019-10-18 PROCEDURE — 80156 ASSAY CARBAMAZEPINE TOTAL: CPT

## 2019-10-18 PROCEDURE — 36415 COLL VENOUS BLD VENIPUNCTURE: CPT | Performed by: PSYCHIATRY & NEUROLOGY

## 2019-10-18 PROCEDURE — 80053 COMPREHEN METABOLIC PANEL: CPT | Performed by: PSYCHIATRY & NEUROLOGY

## 2019-10-18 PROCEDURE — 85025 COMPLETE CBC W/AUTO DIFF WBC: CPT | Performed by: PSYCHIATRY & NEUROLOGY

## 2019-10-22 ENCOUNTER — TELEPHONE (OUTPATIENT)
Dept: NEUROLOGY | Facility: CLINIC | Age: 49
End: 2019-10-22

## 2019-10-25 ENCOUNTER — TELEPHONE (OUTPATIENT)
Dept: FAMILY MEDICINE CLINIC | Facility: CLINIC | Age: 49
End: 2019-10-25

## 2019-10-25 DIAGNOSIS — F41.9 ANXIETY: ICD-10-CM

## 2019-10-25 RX ORDER — HYDROXYZINE HYDROCHLORIDE 25 MG/1
25 TABLET, FILM COATED ORAL EVERY 8 HOURS PRN
Qty: 30 TABLET | Refills: 0 | Status: SHIPPED | OUTPATIENT
Start: 2019-10-25 | End: 2019-11-25 | Stop reason: SDUPTHER

## 2019-11-25 DIAGNOSIS — F41.9 ANXIETY: ICD-10-CM

## 2019-11-25 RX ORDER — HYDROXYZINE HYDROCHLORIDE 25 MG/1
25 TABLET, FILM COATED ORAL EVERY 8 HOURS PRN
Qty: 30 TABLET | Refills: 0 | Status: SHIPPED | OUTPATIENT
Start: 2019-11-25 | End: 2020-01-03 | Stop reason: SDUPTHER

## 2020-01-03 DIAGNOSIS — F41.9 ANXIETY: ICD-10-CM

## 2020-01-03 RX ORDER — HYDROXYZINE HYDROCHLORIDE 25 MG/1
25 TABLET, FILM COATED ORAL EVERY 8 HOURS PRN
Qty: 30 TABLET | Refills: 0 | Status: SHIPPED | OUTPATIENT
Start: 2020-01-03 | End: 2020-02-05 | Stop reason: SDUPTHER

## 2020-02-05 DIAGNOSIS — F41.9 ANXIETY: ICD-10-CM

## 2020-02-05 RX ORDER — HYDROXYZINE HYDROCHLORIDE 25 MG/1
25 TABLET, FILM COATED ORAL EVERY 8 HOURS PRN
Qty: 30 TABLET | Refills: 0 | Status: SHIPPED | OUTPATIENT
Start: 2020-02-05 | End: 2020-03-16 | Stop reason: SDUPTHER

## 2020-03-16 DIAGNOSIS — F41.9 ANXIETY: ICD-10-CM

## 2020-03-16 RX ORDER — HYDROXYZINE HYDROCHLORIDE 25 MG/1
25 TABLET, FILM COATED ORAL EVERY 8 HOURS PRN
Qty: 30 TABLET | Refills: 0 | Status: SHIPPED | OUTPATIENT
Start: 2020-03-16 | End: 2020-04-14

## 2020-04-14 DIAGNOSIS — F41.9 ANXIETY: ICD-10-CM

## 2020-04-14 RX ORDER — HYDROXYZINE HYDROCHLORIDE 25 MG/1
TABLET, FILM COATED ORAL
Qty: 30 TABLET | Refills: 0 | Status: SHIPPED | OUTPATIENT
Start: 2020-04-14 | End: 2020-05-18

## 2020-05-18 DIAGNOSIS — F41.9 ANXIETY: ICD-10-CM

## 2020-05-18 RX ORDER — HYDROXYZINE HYDROCHLORIDE 25 MG/1
TABLET, FILM COATED ORAL
Qty: 30 TABLET | Refills: 0 | Status: SHIPPED | OUTPATIENT
Start: 2020-05-18 | End: 2020-06-17

## 2020-06-17 DIAGNOSIS — F41.9 ANXIETY: ICD-10-CM

## 2020-06-17 RX ORDER — HYDROXYZINE HYDROCHLORIDE 25 MG/1
25 TABLET, FILM COATED ORAL EVERY 8 HOURS PRN
Qty: 30 TABLET | Refills: 0 | Status: SHIPPED | OUTPATIENT
Start: 2020-06-17 | End: 2020-07-15

## 2020-07-15 DIAGNOSIS — F41.9 ANXIETY: ICD-10-CM

## 2020-07-15 RX ORDER — HYDROXYZINE HYDROCHLORIDE 25 MG/1
TABLET, FILM COATED ORAL
Qty: 30 TABLET | Refills: 0 | Status: SHIPPED | OUTPATIENT
Start: 2020-07-15 | End: 2020-08-19

## 2020-08-19 DIAGNOSIS — F41.9 ANXIETY: ICD-10-CM

## 2020-08-19 RX ORDER — HYDROXYZINE HYDROCHLORIDE 25 MG/1
TABLET, FILM COATED ORAL
Qty: 30 TABLET | Refills: 0 | Status: SHIPPED | OUTPATIENT
Start: 2020-08-19 | End: 2020-09-24 | Stop reason: SDUPTHER

## 2020-09-18 DIAGNOSIS — F41.9 ANXIETY: ICD-10-CM

## 2020-09-18 RX ORDER — HYDROXYZINE HYDROCHLORIDE 25 MG/1
TABLET, FILM COATED ORAL
Qty: 30 TABLET | Refills: 0 | OUTPATIENT
Start: 2020-09-18

## 2020-09-24 ENCOUNTER — TELEPHONE (OUTPATIENT)
Dept: FAMILY MEDICINE CLINIC | Facility: CLINIC | Age: 50
End: 2020-09-24

## 2020-09-24 DIAGNOSIS — F41.9 ANXIETY: ICD-10-CM

## 2020-09-24 RX ORDER — HYDROXYZINE HYDROCHLORIDE 25 MG/1
25 TABLET, FILM COATED ORAL EVERY 8 HOURS PRN
Qty: 30 TABLET | Refills: 0 | Status: SHIPPED | OUTPATIENT
Start: 2020-09-24 | End: 2020-11-09

## 2020-09-24 RX ORDER — HYDROXYZINE HYDROCHLORIDE 25 MG/1
25 TABLET, FILM COATED ORAL EVERY 8 HOURS PRN
Qty: 30 TABLET | Refills: 0 | Status: CANCELLED | OUTPATIENT
Start: 2020-09-24

## 2020-09-24 NOTE — TELEPHONE ENCOUNTER
Patient scheduled a med follow up for 09/28/20.  He has been out of the following medication for a few days.    hydrOXYzine (ATARAX) 25 MG tablet     [id number] - [place], [place] - [address] AT [facility] - [phone]  - [phone]   [phone]    Patient call back [phone]

## 2020-09-24 NOTE — TELEPHONE ENCOUNTER
Notify patient that I have refilled his hydroxyzine to pharmacy.  Must keep appointment before any additional refills.

## 2020-09-28 ENCOUNTER — OFFICE VISIT (OUTPATIENT)
Dept: FAMILY MEDICINE CLINIC | Facility: CLINIC | Age: 50
End: 2020-09-28

## 2020-09-28 VITALS
DIASTOLIC BLOOD PRESSURE: 70 MMHG | RESPIRATION RATE: 16 BRPM | BODY MASS INDEX: 24.48 KG/M2 | SYSTOLIC BLOOD PRESSURE: 120 MMHG | TEMPERATURE: 98.4 F | HEART RATE: 78 BPM | WEIGHT: 201 LBS | OXYGEN SATURATION: 97 % | HEIGHT: 76 IN

## 2020-09-28 DIAGNOSIS — F41.9 ANXIETY: ICD-10-CM

## 2020-09-28 DIAGNOSIS — G50.0 TRIGEMINAL NEURALGIA OF RIGHT SIDE OF FACE: Primary | ICD-10-CM

## 2020-09-28 DIAGNOSIS — Z79.899 HIGH RISK MEDICATION USE: ICD-10-CM

## 2020-09-28 DIAGNOSIS — Z11.59 NEED FOR HEPATITIS C SCREENING TEST: ICD-10-CM

## 2020-09-28 PROBLEM — H52.10 MYOPIA: Status: ACTIVE | Noted: 2017-06-29

## 2020-09-28 PROCEDURE — 99214 OFFICE O/P EST MOD 30 MIN: CPT | Performed by: PHYSICIAN ASSISTANT

## 2020-09-28 NOTE — PROGRESS NOTES
Subjective   Donald Carter is a 50 y.o. male presents for   Chief Complaint   Patient presents with   • Anxiety     Trigeminal nerve       History of Present Illness     Donald is a 50-year-old male who presents for anxiety and trigeminal nerve management.  States he has been taking the Tegretol medication as directed.  Has been seeing Dr. Pako Calderon, neurologist.  States he is past due for Tegretol level check.  Would like to have that done today if possible.  States the medication has helped with his trigeminal nerve neuralgia.  He has not had a flare up in some time.  He has been taking the hydroxyzine nightly which has helped with his stress anxiety.  Denied any suicidal or homicidal ideation.  Denied any current upper respiratory symptoms, fevers, chills, chest pain, shortness of air, dizziness, vision changes or swelling of ankles.  Appetite and sleep have been normal.  Bowel movements are daily without dark black tarry stools.  Refuses updated flu shot today.  States he will get this at school or at the pharmacy.  He is non fasting today.    The following portions of the patient's history were reviewed and updated as appropriate: allergies, current medications, past family history, past medical history, past social history, past surgical history and problem list.    Review of Systems   Constitutional: Negative.  Negative for chills and fatigue.   HENT: Negative.    Eyes: Negative.    Respiratory: Negative.  Negative for cough, chest tightness, shortness of breath and wheezing.    Cardiovascular: Negative.  Negative for chest pain, palpitations and leg swelling.   Gastrointestinal: Negative for abdominal pain, blood in stool, constipation, diarrhea, nausea and vomiting.   Endocrine: Negative.    Genitourinary: Negative.    Musculoskeletal: Negative.    Skin: Negative.    Allergic/Immunologic: Negative.    Neurological: Negative.  Negative for dizziness, light-headedness, numbness and headaches.  "  Hematological: Negative.    Psychiatric/Behavioral: Negative.  Negative for sleep disturbance and suicidal ideas.         Vitals:    09/28/20 0920   BP: 120/70   BP Location: Left arm   Patient Position: Sitting   Cuff Size: Adult   Pulse: 78   Resp: 16   Temp: 98.4 °F (36.9 °C)   SpO2: 97%   Weight: 91.2 kg (201 lb)   Height: 193 cm (76\")     Wt Readings from Last 3 Encounters:   09/28/20 91.2 kg (201 lb)   09/26/19 90.7 kg (200 lb)   03/22/19 91.6 kg (202 lb)     BP Readings from Last 3 Encounters:   09/28/20 120/70   09/26/19 118/72   03/22/19 110/77     Social History     Socioeconomic History   • Marital status:      Spouse name: Not on file   • Number of children: Not on file   • Years of education: Not on file   • Highest education level: Not on file   Tobacco Use   • Smoking status: Never Smoker   • Smokeless tobacco: Never Used   Substance and Sexual Activity   • Alcohol use: No   • Drug use: No   • Sexual activity: Yes     Partners: Female       Allergies   Allergen Reactions   • Oxycodone Other (See Comments)     Makes hyperactive       Body mass index is 24.47 kg/m².    Objective   Physical Exam  Constitutional:       Appearance: Normal appearance. He is well-developed, well-groomed and normal weight.      Interventions: Face mask in place.   HENT:      Head: Normocephalic and atraumatic.   Neck:      Musculoskeletal: Neck supple.      Thyroid: No thyroid mass, thyromegaly or thyroid tenderness.      Trachea: Trachea and phonation normal.   Cardiovascular:      Rate and Rhythm: Normal rate and regular rhythm.      Pulses: Normal pulses.      Heart sounds: Normal heart sounds, S1 normal and S2 normal. No murmur.   Pulmonary:      Effort: Pulmonary effort is normal.      Breath sounds: Normal breath sounds.   Abdominal:      General: Abdomen is flat. Bowel sounds are normal.      Palpations: Abdomen is soft. There is no hepatomegaly.      Tenderness: There is no abdominal tenderness. There is no " right CVA tenderness, left CVA tenderness, guarding or rebound. Negative signs include Bhatti's sign, Rovsing's sign, McBurney's sign, psoas sign and obturator sign.   Musculoskeletal:      Right lower leg: No edema.      Left lower leg: No edema.   Skin:     General: Skin is warm and dry.      Capillary Refill: Capillary refill takes less than 2 seconds.   Neurological:      General: No focal deficit present.      Mental Status: He is alert and oriented to person, place, and time.      Deep Tendon Reflexes:      Reflex Scores:       Patellar reflexes are 2+ on the right side and 2+ on the left side.  Psychiatric:         Attention and Perception: Attention and perception normal.         Mood and Affect: Mood and affect normal.         Speech: Speech normal.         Behavior: Behavior normal. Behavior is cooperative.         Thought Content: Thought content normal.         Cognition and Memory: Cognition and memory normal.         Judgment: Judgment normal.      I was wearing surgical mask during the entire office visit encounter.      Assessment/Plan   Donald was seen today for anxiety.    Diagnoses and all orders for this visit:    Trigeminal neuralgia of right side of face  -     CBC & Differential  -     Comprehensive Metabolic Panel  -     Carbamazepine level, total    Anxiety    High risk medication use  -     CBC & Differential  -     Comprehensive Metabolic Panel  -     Carbamazepine level, total    Need for hepatitis C screening test  -     Hepatitis C Antibody    1.  Chronic and stable trigeminal nerve neuralgia: Doing well with his Tegretol medication.  Will check a CBC, BMP, and Tegretol level at office visit today.  We will send courtesy copy to his neurologist, Dr. Pako Calderon.  He will keep follow-up appointments with Dr. Calderon as well.  2.  Chronic and stable anxiety: Takes hydroxyzine as needed for anxiety.  Doing well with the medication.  No refills are needed at this time.  I have asked Donald to  schedule follow-up appointment for annual physical examination with fasting lab work in the next 3 months.  He voiced understanding.  3.  Need for hepatitis C screening: I will check hepatitis C antibody test today.  He will be notified of test results when completed.      [facility]  [address] [facility] [address]  Dept: [phone]  Dept Fax: [phone]  Loc: [phone]  Loc Fax: [phone]           No visits with results within 2 Week(s) from this visit.   Latest known visit with results is:   Lab on 10/18/2019   Component Date Value Ref Range Status   • Carbamazepine Level 10/18/2019 5.8  4.0 - 12.0 mcg/mL Final       Answers for HPI/ROS submitted by the patient on 9/25/2020   What is the primary reason for your visit?: Other  Please describe your symptoms.: Prescription refill follow-up appt.  Have you had these symptoms before?: Yes  How long have you been having these symptoms?: Greater than 2 weeks  Please list any medications you are currently taking for this condition.: I currently take Tegretol 200mg twice daily and Hydroxyzine as needed.

## 2020-09-29 LAB
ALBUMIN SERPL-MCNC: 4.2 G/DL (ref 3.5–5.2)
ALBUMIN/GLOB SERPL: 1.8 G/DL
ALP SERPL-CCNC: 67 U/L (ref 39–117)
ALT SERPL-CCNC: 15 U/L (ref 1–41)
AST SERPL-CCNC: 15 U/L (ref 1–40)
BASOPHILS # BLD AUTO: 0.01 10*3/MM3 (ref 0–0.2)
BASOPHILS NFR BLD AUTO: 0.2 % (ref 0–1.5)
BILIRUB SERPL-MCNC: 0.2 MG/DL (ref 0–1.2)
BUN SERPL-MCNC: 18 MG/DL (ref 6–20)
BUN/CREAT SERPL: 18.6 (ref 7–25)
CALCIUM SERPL-MCNC: 9.1 MG/DL (ref 8.6–10.5)
CARBAMAZEPINE SERPL-MCNC: 6.2 MCG/ML (ref 4–12)
CHLORIDE SERPL-SCNC: 103 MMOL/L (ref 98–107)
CO2 SERPL-SCNC: 27.8 MMOL/L (ref 22–29)
CREAT SERPL-MCNC: 0.97 MG/DL (ref 0.76–1.27)
EOSINOPHIL # BLD AUTO: 0 10*3/MM3 (ref 0–0.4)
EOSINOPHIL NFR BLD AUTO: 0 % (ref 0.3–6.2)
ERYTHROCYTE [DISTWIDTH] IN BLOOD BY AUTOMATED COUNT: 12.1 % (ref 12.3–15.4)
GLOBULIN SER CALC-MCNC: 2.4 GM/DL
GLUCOSE SERPL-MCNC: 75 MG/DL (ref 65–99)
HCT VFR BLD AUTO: 41.6 % (ref 37.5–51)
HCV AB S/CO SERPL IA: 0.2 S/CO RATIO (ref 0–0.9)
HGB BLD-MCNC: 13.8 G/DL (ref 13–17.7)
IMM GRANULOCYTES # BLD AUTO: 0.01 10*3/MM3 (ref 0–0.05)
IMM GRANULOCYTES NFR BLD AUTO: 0.2 % (ref 0–0.5)
LYMPHOCYTES # BLD AUTO: 2.2 10*3/MM3 (ref 0.7–3.1)
LYMPHOCYTES NFR BLD AUTO: 48.4 % (ref 19.6–45.3)
MCH RBC QN AUTO: 31.5 PG (ref 26.6–33)
MCHC RBC AUTO-ENTMCNC: 33.2 G/DL (ref 31.5–35.7)
MCV RBC AUTO: 95 FL (ref 79–97)
MONOCYTES # BLD AUTO: 0.37 10*3/MM3 (ref 0.1–0.9)
MONOCYTES NFR BLD AUTO: 8.1 % (ref 5–12)
NEUTROPHILS # BLD AUTO: 1.96 10*3/MM3 (ref 1.7–7)
NEUTROPHILS NFR BLD AUTO: 43.1 % (ref 42.7–76)
NRBC BLD AUTO-RTO: 0 /100 WBC (ref 0–0.2)
PLATELET # BLD AUTO: 291 10*3/MM3 (ref 140–450)
POTASSIUM SERPL-SCNC: 4.6 MMOL/L (ref 3.5–5.2)
PROT SERPL-MCNC: 6.6 G/DL (ref 6–8.5)
RBC # BLD AUTO: 4.38 10*6/MM3 (ref 4.14–5.8)
SODIUM SERPL-SCNC: 137 MMOL/L (ref 136–145)
WBC # BLD AUTO: 4.55 10*3/MM3 (ref 3.4–10.8)

## 2020-10-13 ENCOUNTER — OFFICE VISIT (OUTPATIENT)
Dept: NEUROLOGY | Facility: CLINIC | Age: 50
End: 2020-10-13

## 2020-10-13 VITALS
SYSTOLIC BLOOD PRESSURE: 108 MMHG | DIASTOLIC BLOOD PRESSURE: 68 MMHG | WEIGHT: 209 LBS | HEIGHT: 76 IN | HEART RATE: 58 BPM | BODY MASS INDEX: 25.45 KG/M2 | OXYGEN SATURATION: 96 %

## 2020-10-13 DIAGNOSIS — G50.0 TRIGEMINAL NEURALGIA OF RIGHT SIDE OF FACE: ICD-10-CM

## 2020-10-13 PROCEDURE — 99213 OFFICE O/P EST LOW 20 MIN: CPT | Performed by: PSYCHIATRY & NEUROLOGY

## 2020-10-13 RX ORDER — CARBAMAZEPINE 200 MG/1
400 TABLET ORAL 2 TIMES DAILY
Qty: 360 TABLET | Refills: 4 | Status: SHIPPED | OUTPATIENT
Start: 2020-10-13 | End: 2021-10-15 | Stop reason: SDUPTHER

## 2020-10-13 NOTE — PROGRESS NOTES
CC: Right V2 trigeminal neuralgia    HPI:  Donald Carter is a  50 y.o. right-handed white male who I am seeing in follow-up with right-sided V2 trigeminal neuralgia.  I saw him last 9/26/2019.  He was initially evaluated back in [place] with an MRI of the brain which he reported showed no specific abnormality.  He was treated with carbamazepine 200 mg twice daily which worked fairly well.  He did develop some hyponatremia down to 123 but this resolved.  Recently he had an exacerbation of symptoms in he escalated his dosage to 3 tablets daily which did help some then 4 tablets daily which is helping pretty well.  His pain is much better controlled.  About 2.5 weeks ago he had labs including a sodium of 137, ALT 15, AST 15, alk phos 67 and bilirubin 0.2.  Hemoglobin 13.8, hematocrit 41.6, white count 4.55 and platelets 291,000.  Carbamazepine was 6.2.    He notes perhaps a slight bit of side effect but not much.  In the morning prior to his first dose he notes some blurring of vision in the right eye which seems to get better as time goes on.  Last time he saw his eye doctor was about 3 months ago he said.  I cannot make heads or tails of the relationship between this, his trigeminal neuralgia and his carbamazepine.    No other new problems to report.        Past Medical History:   Diagnosis Date   • Appendicitis    • Gastritis    • GERD (gastroesophageal reflux disease)    • Kidney stones    • Occipital neuralgia    • Trigeminal neuralgia          Past Surgical History:   Procedure Laterality Date   • APPENDECTOMY N/A 6/22/2018    Procedure: Laparoscopic appendectomy;  Surgeon: Haydee Cortez MD;  Location: [facility];  Service: General   • TONSILLECTOMY     • UPPER GASTROINTESTINAL ENDOSCOPY      [place]- normal    • WISDOM TOOTH EXTRACTION             Current Outpatient Medications:   •  carBAMazepine (TEGretol) 200 MG tablet, Take 2 tablets by mouth 2 (Two) Times a Day., Disp: 360 tablet, Rfl: 4  •   fluticasone (FLONASE) 50 MCG/ACT nasal spray, 2 sprays into each nostril Daily., Disp: , Rfl:   •  hydrOXYzine (ATARAX) 25 MG tablet, Take 1 tablet by mouth Every 8 (Eight) Hours As Needed for Itching., Disp: 30 tablet, Rfl: 0  •  loratadine (CLARITIN) 10 MG tablet, Take 10 mg by mouth Daily., Disp: , Rfl:   •  cyclobenzaprine (FLEXERIL) 5 MG tablet, TAKE 1 TABLET BY MOUTH TWICE DAILY AS NEEDED FOR MUSCLE SPASMS (Patient taking differently: No longer taking per patient), Disp: 60 tablet, Rfl: 5      Family History   Problem Relation Age of Onset   • Hypertension Mother    • Hyperlipidemia Mother    • Hypertension Father    • Hyperlipidemia Father    • No Known Problems Brother    • No Known Problems Daughter    • No Known Problems Son    • Lupus Paternal Grandmother    • COPD Paternal Grandfather    • Malig Hyperthermia Neg Hx          Social History     Socioeconomic History   • Marital status:      Spouse name: Not on file   • Number of children: Not on file   • Years of education: Not on file   • Highest education level: Not on file   Tobacco Use   • Smoking status: Never Smoker   • Smokeless tobacco: Never Used   Substance and Sexual Activity   • Alcohol use: No   • Drug use: No   • Sexual activity: Yes     Partners: Female         Allergies   Allergen Reactions   • Oxycodone Other (See Comments)     Makes hyperactive         Pain Scale: 0/10        ROS:  Review of Systems   Constitutional: Negative for activity change, appetite change and fever.   HENT: Negative for ear pain, tinnitus and trouble swallowing.    Eyes: Negative for photophobia, pain and visual disturbance.   Respiratory: Negative for cough, chest tightness and shortness of breath.    Cardiovascular: Negative for chest pain, palpitations and leg swelling.   Endocrine: Negative for cold intolerance, heat intolerance and polydipsia.   Musculoskeletal: Negative for back pain, gait problem and neck pain.   Allergic/Immunologic: Negative for  "environmental allergies, food allergies and immunocompromised state.   Neurological: Negative for dizziness, tremors, seizures, syncope, facial asymmetry, speech difficulty, weakness, light-headedness, numbness and headaches.   Hematological: Negative for adenopathy. Does not bruise/bleed easily.   Psychiatric/Behavioral: Negative for agitation, behavioral problems, confusion, decreased concentration, dysphoric mood, hallucinations, self-injury, sleep disturbance and suicidal ideas. The patient is not nervous/anxious and is not hyperactive.          I have reviewed and agree with the above ROS completed by the medical assistant.      Physical Exam:  Vitals:    10/13/20 1005   BP: 108/68   Pulse: 58   SpO2: 96%   Weight: 94.8 kg (209 lb)   Height: 193 cm (75.98\")     Orthostatic BP:    Body mass index is 25.45 kg/m².    Physical Exam  General: Well-developed white male no acute distress  HEENT: Normocephalic no evidence of trauma  Neck: Supple  Heart: Regular rate and rhythm  Extremities: No edema      Neurological Exam:   Mental Status: Awake, alert, oriented to person, place and time.  Conversant without evidence of an affective disorder, thought disorder, delusions or hallucinations.  Attention span and concentration are normal.  HCF: No aphasia, apraxia or dysarthria.  Recent and remote memory intact.  Knowledge of recent events intact.  CN: I:   II: Visual fields full without left inattention   III, IV, VI: Eye movements intact without nystagmus or ptosis.  Pupils equal  round and reactive to light.   V,VII: Light touch and pinprick intact all 3 divisions of V.  Facial muscles symmetrical.   VIII: Hearing intact to finger rub   IX,X: Soft palate elevates symmetrically   XI: Sternomastoid and trapezius are strong.   XII: Tongue midline without atrophy or fasciculations  Motor: Normal tone and bulk in the upper and lower extremities   Power testing: No focal weakness identified  Reflexes: Upper extremities:        " Lower extremities:        Toe signs:  Sensory: Light touch:        Pinprick:        Vibration:        Position:    Cerebellar: Finger-to-nose:           Rapid movement:           Heel-to-shin:  Gait and Station: Normal gait and station    Results:      Lab Results   Component Value Date    GLUCOSE 92 10/18/2019    BUN 18 09/28/2020    CREATININE 0.97 09/28/2020    EGFRIFNONA 82 09/28/2020    EGFRIFAFRI 100 09/28/2020    BCR 18.6 09/28/2020    CO2 27.8 09/28/2020    CALCIUM 9.1 09/28/2020    PROTENTOTREF 6.6 09/28/2020    ALBUMIN 4.20 09/28/2020    LABIL2 1.8 09/28/2020    AST 15 09/28/2020    ALT 15 09/28/2020       Lab Results   Component Value Date    WBC 4.55 09/28/2020    HGB 13.8 09/28/2020    HCT 41.6 09/28/2020    MCV 95.0 09/28/2020     09/28/2020         .No results found for: RPR      Lab Results   Component Value Date    TSH 1.810 08/24/2018    T8ZFTPZ 5.7 08/24/2018         Lab Results   Component Value Date    FIPCLZBA98 946 08/24/2018         No results found for: FOLATE      No results found for: HGBA1C      Lab Results   Component Value Date    GLUCOSE 92 10/18/2019    BUN 18 09/28/2020    CREATININE 0.97 09/28/2020    EGFRIFNONA 82 09/28/2020    EGFRIFAFRI 100 09/28/2020    BCR 18.6 09/28/2020    K 4.6 09/28/2020    CO2 27.8 09/28/2020    CALCIUM 9.1 09/28/2020    PROTENTOTREF 6.6 09/28/2020    ALBUMIN 4.20 09/28/2020    LABIL2 1.8 09/28/2020    AST 15 09/28/2020    ALT 15 09/28/2020         Lab Results   Component Value Date    WBC 4.55 09/28/2020    HGB 13.8 09/28/2020    HCT 41.6 09/28/2020    MCV 95.0 09/28/2020     09/28/2020             Assessment:   1.  Right-sided V2 trigeminal neuralgia-recent exacerbation now better on higher dose carbamazepine at 400 mg twice daily.  Minimal if any side effect.  Recent labs were reviewed as noted above          Plan:  1.  Continue carbamazepine 400 mg twice daily.  He sees his primary care doctor twice yearly.  Recommend labs if he develops  unusual symptoms or perhaps on a six-month basis.  He is obtaining labs through primary care.  2.  Call for problems otherwise follow-up in about a year.  Should he have deterioration of control we may repeat his MRI looking for a vascular loop.  This would be important shortly consider any more invasive treatment.  Other medications which may we may consider include oxcarbazepine gabapentin and baclofen            >50% of this 15-minute follow-up was spent counseling the patient on treatment options for trigeminal neuralgia should they be deemed necessary to pursue.  Side effects of medication were also reviewed.              Dictated utilizing Dragon dictation.

## 2020-11-07 DIAGNOSIS — F41.9 ANXIETY: ICD-10-CM

## 2020-11-09 RX ORDER — HYDROXYZINE HYDROCHLORIDE 25 MG/1
TABLET, FILM COATED ORAL
Qty: 30 TABLET | Refills: 3 | Status: SHIPPED | OUTPATIENT
Start: 2020-11-09 | End: 2021-08-02 | Stop reason: SDUPTHER

## 2021-04-06 ENCOUNTER — BULK ORDERING (OUTPATIENT)
Dept: CASE MANAGEMENT | Facility: OTHER | Age: 51
End: 2021-04-06

## 2021-04-06 DIAGNOSIS — Z23 IMMUNIZATION DUE: ICD-10-CM

## 2021-04-27 DIAGNOSIS — F41.9 ANXIETY: ICD-10-CM

## 2021-04-27 RX ORDER — HYDROXYZINE HYDROCHLORIDE 25 MG/1
TABLET, FILM COATED ORAL
Qty: 30 TABLET | Refills: 3 | OUTPATIENT
Start: 2021-04-27

## 2021-08-02 DIAGNOSIS — F41.9 ANXIETY: ICD-10-CM

## 2021-08-03 RX ORDER — HYDROXYZINE HYDROCHLORIDE 25 MG/1
25 TABLET, FILM COATED ORAL EVERY 8 HOURS PRN
Qty: 30 TABLET | Refills: 3 | Status: SHIPPED | OUTPATIENT
Start: 2021-08-03

## 2021-10-15 ENCOUNTER — OFFICE VISIT (OUTPATIENT)
Dept: NEUROLOGY | Facility: CLINIC | Age: 51
End: 2021-10-15

## 2021-10-15 VITALS
WEIGHT: 208 LBS | DIASTOLIC BLOOD PRESSURE: 80 MMHG | SYSTOLIC BLOOD PRESSURE: 138 MMHG | OXYGEN SATURATION: 96 % | HEIGHT: 76 IN | BODY MASS INDEX: 25.33 KG/M2 | HEART RATE: 78 BPM

## 2021-10-15 DIAGNOSIS — G50.0 TRIGEMINAL NEURALGIA OF RIGHT SIDE OF FACE: ICD-10-CM

## 2021-10-15 PROCEDURE — 99214 OFFICE O/P EST MOD 30 MIN: CPT | Performed by: PSYCHIATRY & NEUROLOGY

## 2021-10-15 RX ORDER — CARBAMAZEPINE 200 MG/1
400 TABLET ORAL 2 TIMES DAILY
Qty: 300 TABLET | Refills: 4 | Status: SHIPPED | OUTPATIENT
Start: 2021-10-15 | End: 2021-12-15

## 2021-10-15 RX ORDER — CLOBETASOL PROPIONATE 0.05 MG/G
GEL TOPICAL
COMMUNITY
Start: 2021-09-23

## 2021-10-15 NOTE — PROGRESS NOTES
CC:    HPI:  Donald Carter is a  51 y.o. right-handed white male who is here for a yearly follow-up regarding trigeminal neuralgia involving the right V2 branch. I saw him last 10/13/2020. He had been evaluated in [place] and apparently his MRI showed no specific abnormality. He has been maintained on carbamazepine currently taking 200 mg morning and 400 mg evening. He does not seem to have any side effects with this dose. For a while he was on 400 mg twice daily and he has some minor side effects. He had some hyponatremia early on but follow-up showed resolution. He has not had any labs recently.    A new symptom which she has noticed in the last 2 months is numbness on the right index finger. There is no pain and it does not wake him up. He does not have any other numbness in the right hand. He indicates about the time that this started he was carrying a ladder which was not really very heavy he said but he lost balance with it and jerked his neck and he has had some neck pain on the left side primarily which may radiate into the left triceps. Does not note any numbness in the left upper extremity and he notices no weakness in either upper extremity.        Past Medical History:   Diagnosis Date   • Appendicitis    • Gastritis    • GERD (gastroesophageal reflux disease)    • Kidney stones    • Occipital neuralgia    • Trigeminal neuralgia          Past Surgical History:   Procedure Laterality Date   • APPENDECTOMY N/A 6/22/2018    Procedure: Laparoscopic appendectomy;  Surgeon: Haydee Cortez MD;  Location: [facility];  Service: General   • TONSILLECTOMY     • UPPER GASTROINTESTINAL ENDOSCOPY      [place]- normal    • WISDOM TOOTH EXTRACTION             Current Outpatient Medications:   •  carBAMazepine (TEGretol) 200 MG tablet, Take 2 tablets by mouth 2 (Two) Times a Day., Disp: 300 tablet, Rfl: 4  •  clobetasol (TEMOVATE) 0.05 % gel, APPLY TO ULCERATIONS WITH GAUZE AFTER EACH MEALS AND EVERY NIGHT AT  BEDTIME, Disp: , Rfl:   •  cyclobenzaprine (FLEXERIL) 5 MG tablet, TAKE 1 TABLET BY MOUTH TWICE DAILY AS NEEDED FOR MUSCLE SPASMS (Patient taking differently: No longer taking per patient), Disp: 60 tablet, Rfl: 5  •  hydrOXYzine (ATARAX) 25 MG tablet, Take 1 tablet by mouth Every 8 (Eight) Hours As Needed for Itching., Disp: 30 tablet, Rfl: 3  •  loratadine (CLARITIN) 10 MG tablet, Take 10 mg by mouth Daily., Disp: , Rfl:   •  fluticasone (FLONASE) 50 MCG/ACT nasal spray, 2 sprays into each nostril Daily., Disp: , Rfl:       Family History   Problem Relation Age of Onset   • Hypertension Mother    • Hyperlipidemia Mother    • Hypertension Father    • Hyperlipidemia Father    • No Known Problems Brother    • No Known Problems Daughter    • No Known Problems Son    • Lupus Paternal Grandmother    • COPD Paternal Grandfather    • Malig Hyperthermia Neg Hx          Social History     Socioeconomic History   • Marital status:    Tobacco Use   • Smoking status: Never Smoker   • Smokeless tobacco: Never Used   Substance and Sexual Activity   • Alcohol use: No   • Drug use: No   • Sexual activity: Yes     Partners: Female         Allergies   Allergen Reactions   • Oxycodone Other (See Comments)     Makes hyperactive         Pain Scale: 0/10        ROS:  Review of Systems   Constitutional: Negative for activity change, appetite change and diaphoresis.   Allergic/Immunologic: Negative for environmental allergies and food allergies.   Neurological: Positive for numbness. Negative for dizziness, tremors, seizures, syncope, facial asymmetry, speech difficulty, weakness, light-headedness and headaches.   Psychiatric/Behavioral: Negative for agitation, behavioral problems, confusion, decreased concentration, dysphoric mood, hallucinations, self-injury, sleep disturbance and suicidal ideas. The patient is not nervous/anxious and is not hyperactive.          I have reviewed and agree with the above ROS completed by the  "medical assistant.      Physical Exam:  Vitals:    10/15/21 1028   BP: 138/80   Pulse: 78   SpO2: 96%   Weight: 94.3 kg (208 lb)   Height: 193 cm (75.98\")     Orthostatic BP:    Body mass index is 25.33 kg/m².    Physical Exam  General: Well-developed white male no acute distress  HEENT: Normocephalic no evidence of trauma  Neck: Supple  Heart:  Extremities:      Neurological Exam:   Mental Status: Awake, alert, oriented to person, place and time.  Conversant without evidence of an affective disorder, thought disorder, delusions or hallucinations.  Attention span and concentration are normal.  HCF: No aphasia, apraxia or dysarthria.  Recent and remote memory intact.  Knowledge  of recent events intact.  CN: I:   II: Visual fields full without left inattention   III, IV, VI: Eye movements intact without nystagmus or ptosis.    V,VII:.  Facial muscles symmetrical.   VIII: Hearing intact   IX,X: Soft palate elevates symmetrically   XI: Sternomastoid and trapezius are strong.   XII: Tongue midline without atrophy or fasciculations  Motor: Normal tone and bulk in the upper and lower extremities   Power testing: Full power in all muscles tested in the upper extremities.  Reflexes: Upper extremities:        Lower extremities:        Toe signs:  Sensory: Light touch: Reduced on the tip of the right index finger otherwise intact in both hands        Pinprick: Similar to light touch        Vibration:        Position:    Cerebellar: Finger-to-nose: Intact           Rapid movement: Intact           Heel-to-shin:  Gait and Station: Normal casual toe heel and tandem walk. No Romberg and no drift    Results:      Lab Results   Component Value Date    GLUCOSE 92 10/18/2019    BUN 18 09/28/2020    CREATININE 0.97 09/28/2020    EGFRIFNONA 82 09/28/2020    EGFRIFAFRI 100 09/28/2020    BCR 18.6 09/28/2020    CO2 27.8 09/28/2020    CALCIUM 9.1 09/28/2020    PROTENTOTREF 6.6 09/28/2020    ALBUMIN 4.20 09/28/2020    LABIL2 1.8 09/28/2020 "    AST 15 09/28/2020    ALT 15 09/28/2020       Lab Results   Component Value Date    WBC 4.55 09/28/2020    HGB 13.8 09/28/2020    HCT 41.6 09/28/2020    MCV 95.0 09/28/2020     09/28/2020         .No results found for: RPR      Lab Results   Component Value Date    TSH 1.810 08/24/2018    T1TPKOF 5.7 08/24/2018         Lab Results   Component Value Date    QGOPBVOX56 946 08/24/2018         No results found for: FOLATE      No results found for: HGBA1C      Lab Results   Component Value Date    GLUCOSE 92 10/18/2019    BUN 18 09/28/2020    CREATININE 0.97 09/28/2020    EGFRIFNONA 82 09/28/2020    EGFRIFAFRI 100 09/28/2020    BCR 18.6 09/28/2020    K 4.6 09/28/2020    CO2 27.8 09/28/2020    CALCIUM 9.1 09/28/2020    PROTENTOTREF 6.6 09/28/2020    ALBUMIN 4.20 09/28/2020    LABIL2 1.8 09/28/2020    AST 15 09/28/2020    ALT 15 09/28/2020         Lab Results   Component Value Date    WBC 4.55 09/28/2020    HGB 13.8 09/28/2020    HCT 41.6 09/28/2020    MCV 95.0 09/28/2020     09/28/2020             Assessment:   1. Right V2 TGN-doing well  2. New numbness in the tip of the right index finger as well as neck pain occasionally radiating into the left triceps-no motor findings in either upper extremity.          Plan:  1. Continue same carbamazepine dosage 200/400  2. Recommend labs CBC and CMP which he gets to his primary office. I asked him to let me know or have that office let me know when those labs are available. He does not need a carbamazepine level since he is not toxic and he is on a lower dose than he was before when his level was 6.2.  3. I do not recommend further evaluation for the numbness on his right index finger or his neck pain. This is because he does not have any weakness and the neck pain is actually improving. He was told if things get worse then we can always pursue further investigation.  4. Follow-up in a year            Time: 30 minutes              Dictated utilizing Antonieta  dictation.

## 2021-12-13 DIAGNOSIS — G50.0 TRIGEMINAL NEURALGIA OF RIGHT SIDE OF FACE: ICD-10-CM

## 2021-12-15 RX ORDER — CARBAMAZEPINE 200 MG/1
TABLET ORAL
Qty: 360 TABLET | Refills: 3 | Status: SHIPPED | OUTPATIENT
Start: 2021-12-15 | End: 2022-06-09 | Stop reason: SDUPTHER

## 2021-12-17 ENCOUNTER — TELEPHONE (OUTPATIENT)
Dept: NEUROLOGY | Facility: CLINIC | Age: 51
End: 2021-12-17

## 2021-12-17 NOTE — TELEPHONE ENCOUNTER
Caller: Donald Carter    Relationship: Self    Best call back number: [phone]    What orders are you requesting (i.e. lab or imaging): LAB    Where will you receive your lab/imaging services:   [facility]  [phone]    Additional notes:   PT WAS CALLED TODAY AND TOLD HE NEEDED TO HAVE LABS DONE. I DIDN'T SEE THE ORDERS FOR THE LABS BUT IT WAS PT'S UNDERSTANDING THEY WERE BEING REQUESTED BY DR XIE.    PT IS REQUESTING THE LAB ORDERS BE SENT TO THE FAX NUMBER LISTED ABOVE.    PLEASE LET PT KNOW WHEN THOSE HAVE BEEN SENT.

## 2021-12-27 DIAGNOSIS — G50.0 TRIGEMINAL NEURALGIA: Primary | ICD-10-CM

## 2021-12-27 NOTE — TELEPHONE ENCOUNTER
Rubi,    The patient had not gotten his labs and requested we fax the orders for CBC, CMP to the [facility] fax number [phone]    Please let the patient know once this has been done.    IMANI

## 2022-06-09 DIAGNOSIS — G50.0 TRIGEMINAL NEURALGIA OF RIGHT SIDE OF FACE: ICD-10-CM

## 2022-06-09 RX ORDER — CARBAMAZEPINE 200 MG/1
400 TABLET ORAL 2 TIMES DAILY
Qty: 360 TABLET | Refills: 0 | Status: SHIPPED | OUTPATIENT
Start: 2022-06-09 | End: 2022-10-13 | Stop reason: SDUPTHER

## 2022-06-09 NOTE — TELEPHONE ENCOUNTER
Caller: Raul Donald J    Relationship: Self    Best call back number: [phone]    Requested Prescriptions:   Requested Prescriptions     Pending Prescriptions Disp Refills   • carBAMazepine (TEGretol) 200 MG tablet 360 tablet 3     Sig: Take 2 tablets by mouth 2 (Two) Times a Day.      [facility]arm[id number] [place]  [address] AT [facility] OF  [address] [facility] - [phone] [facility] [phone] FX     Additional details provided by patient: PT HAS LESS THAN 3 DAY SUPPLY. PT IS REQUESTING MEDICATION BE SENT TO LOCAL PHARMACY AS HE IS [place]AVING [place] THIS WEEKEND AND NEEDS TO PICK-UP SOON.    Does the patient have less than a 3 day supply:  [x] Yes  [] No    Last refilled: 12/15/21 3 REFILL(S)  Last appointment: 10/15/21  Next appointment: 10/13/22    PLEASE REVIEW AND ADVISE.    Victor Hugo Carmona Rep   06/09/22 11:04 EDT

## 2022-10-13 ENCOUNTER — OFFICE VISIT (OUTPATIENT)
Dept: NEUROLOGY | Facility: CLINIC | Age: 52
End: 2022-10-13

## 2022-10-13 ENCOUNTER — LAB (OUTPATIENT)
Dept: LAB | Facility: HOSPITAL | Age: 52
End: 2022-10-13

## 2022-10-13 VITALS
DIASTOLIC BLOOD PRESSURE: 80 MMHG | SYSTOLIC BLOOD PRESSURE: 115 MMHG | BODY MASS INDEX: 26.07 KG/M2 | HEIGHT: 76 IN | WEIGHT: 214.1 LBS | OXYGEN SATURATION: 97 % | HEART RATE: 74 BPM

## 2022-10-13 DIAGNOSIS — G50.0 TRIGEMINAL NEURALGIA OF RIGHT SIDE OF FACE: Primary | ICD-10-CM

## 2022-10-13 DIAGNOSIS — M54.81 BILATERAL OCCIPITAL NEURALGIA: ICD-10-CM

## 2022-10-13 LAB
ALBUMIN SERPL-MCNC: 4.4 G/DL (ref 3.5–5.2)
ALBUMIN/GLOB SERPL: 1.8 G/DL
ALP SERPL-CCNC: 66 U/L (ref 39–117)
ALT SERPL W P-5'-P-CCNC: 23 U/L (ref 1–41)
ANION GAP SERPL CALCULATED.3IONS-SCNC: 9 MMOL/L (ref 5–15)
AST SERPL-CCNC: 22 U/L (ref 1–40)
BASOPHILS # BLD AUTO: 0.01 10*3/MM3 (ref 0–0.2)
BASOPHILS NFR BLD AUTO: 0.2 % (ref 0–1.5)
BILIRUB SERPL-MCNC: 0.3 MG/DL (ref 0–1.2)
BUN SERPL-MCNC: 9 MG/DL (ref 6–20)
BUN/CREAT SERPL: 10.1 (ref 7–25)
CALCIUM SPEC-SCNC: 9.3 MG/DL (ref 8.6–10.5)
CHLORIDE SERPL-SCNC: 98 MMOL/L (ref 98–107)
CO2 SERPL-SCNC: 26 MMOL/L (ref 22–29)
CREAT SERPL-MCNC: 0.89 MG/DL (ref 0.76–1.27)
DEPRECATED RDW RBC AUTO: 38.5 FL (ref 37–54)
EGFRCR SERPLBLD CKD-EPI 2021: 103.8 ML/MIN/1.73
EOSINOPHIL # BLD AUTO: 0 10*3/MM3 (ref 0–0.4)
EOSINOPHIL NFR BLD AUTO: 0 % (ref 0.3–6.2)
ERYTHROCYTE [DISTWIDTH] IN BLOOD BY AUTOMATED COUNT: 11.8 % (ref 12.3–15.4)
GLOBULIN UR ELPH-MCNC: 2.5 GM/DL
GLUCOSE SERPL-MCNC: 89 MG/DL (ref 65–99)
HCT VFR BLD AUTO: 39.9 % (ref 37.5–51)
HGB BLD-MCNC: 14 G/DL (ref 13–17.7)
IMM GRANULOCYTES # BLD AUTO: 0.02 10*3/MM3 (ref 0–0.05)
IMM GRANULOCYTES NFR BLD AUTO: 0.4 % (ref 0–0.5)
LYMPHOCYTES # BLD AUTO: 2.27 10*3/MM3 (ref 0.7–3.1)
LYMPHOCYTES NFR BLD AUTO: 49.3 % (ref 19.6–45.3)
MCH RBC QN AUTO: 31.5 PG (ref 26.6–33)
MCHC RBC AUTO-ENTMCNC: 35.1 G/DL (ref 31.5–35.7)
MCV RBC AUTO: 89.9 FL (ref 79–97)
MONOCYTES # BLD AUTO: 0.39 10*3/MM3 (ref 0.1–0.9)
MONOCYTES NFR BLD AUTO: 8.5 % (ref 5–12)
NEUTROPHILS NFR BLD AUTO: 1.91 10*3/MM3 (ref 1.7–7)
NEUTROPHILS NFR BLD AUTO: 41.6 % (ref 42.7–76)
NRBC BLD AUTO-RTO: 0 /100 WBC (ref 0–0.2)
PLATELET # BLD AUTO: 274 10*3/MM3 (ref 140–450)
PMV BLD AUTO: 8.6 FL (ref 6–12)
POTASSIUM SERPL-SCNC: 4.6 MMOL/L (ref 3.5–5.2)
PROT SERPL-MCNC: 6.9 G/DL (ref 6–8.5)
RBC # BLD AUTO: 4.44 10*6/MM3 (ref 4.14–5.8)
SODIUM SERPL-SCNC: 133 MMOL/L (ref 136–145)
WBC NRBC COR # BLD: 4.6 10*3/MM3 (ref 3.4–10.8)

## 2022-10-13 PROCEDURE — 36415 COLL VENOUS BLD VENIPUNCTURE: CPT | Performed by: PSYCHIATRY & NEUROLOGY

## 2022-10-13 PROCEDURE — 80053 COMPREHEN METABOLIC PANEL: CPT | Performed by: PSYCHIATRY & NEUROLOGY

## 2022-10-13 PROCEDURE — 99214 OFFICE O/P EST MOD 30 MIN: CPT | Performed by: PSYCHIATRY & NEUROLOGY

## 2022-10-13 PROCEDURE — 85025 COMPLETE CBC W/AUTO DIFF WBC: CPT | Performed by: PSYCHIATRY & NEUROLOGY

## 2022-10-13 RX ORDER — GABAPENTIN 100 MG/1
100 CAPSULE ORAL 3 TIMES DAILY
Qty: 90 CAPSULE | Refills: 5 | Status: SHIPPED | OUTPATIENT
Start: 2022-10-13

## 2022-10-13 RX ORDER — CARBAMAZEPINE 200 MG/1
400 TABLET ORAL 2 TIMES DAILY
Qty: 360 TABLET | Refills: 3 | Status: SHIPPED | OUTPATIENT
Start: 2022-10-13

## 2022-10-13 NOTE — PROGRESS NOTES
CC:Right TGN    HPI:  Donald Carter is a  52 y.o. right-handed white male who I am seeing in follow-up with right V2 trigeminal neuralgia.  The following is an excerpt from my previous note regarding his history with additions/modifications as indicated:    I saw him last 10/15/2020. He had been evaluated in [place] and apparently his MRI showed no specific abnormality. He has been maintained on carbamazepine currently taking 200 mg morning and 400 mg evening. He does not seem to have any side effects with this dose. For a while he was on 400 mg twice daily and he has some minor side effects. He had some hyponatremia early on but follow-up showed resolution. He has not had any labs recently.  He has described some scalp pain which radiates from the hat area or top of the head down the back of his neck.  This occurs shortly after putting a hat on his head.  He does not seem to have shooting pain from the skull base however.  He has occasional twinges of pain from his trigeminal neuralgia but it is quite tolerable he states.    Last labs 9/28/2020 demonstrate normal CBC, sodium 137, normal LFTs.    Past Medical History:   Diagnosis Date   • Appendicitis    • Gastritis    • GERD (gastroesophageal reflux disease)    • Kidney stones    • Occipital neuralgia    • Trigeminal neuralgia          Past Surgical History:   Procedure Laterality Date   • APPENDECTOMY N/A 6/22/2018    Procedure: Laparoscopic appendectomy;  Surgeon: Haydee Cortez MD;  Location: [facility];  Service: General   • TONSILLECTOMY     • UPPER GASTROINTESTINAL ENDOSCOPY      [place]- normal    • WISDOM TOOTH EXTRACTION             Current Outpatient Medications:   •  carBAMazepine (TEGretol) 200 MG tablet, Take 2 tablets by mouth 2 (Two) Times a Day., Disp: 360 tablet, Rfl: 0  •  clobetasol (TEMOVATE) 0.05 % gel, APPLY TO ULCERATIONS WITH GAUZE AFTER EACH MEALS AND EVERY NIGHT AT BEDTIME, Disp: , Rfl:   •  cyclobenzaprine (FLEXERIL) 5 MG  "tablet, TAKE 1 TABLET BY MOUTH TWICE DAILY AS NEEDED FOR MUSCLE SPASMS (Patient taking differently: No longer taking per patient), Disp: 60 tablet, Rfl: 5  •  fluticasone (FLONASE) 50 MCG/ACT nasal spray, 2 sprays into each nostril Daily., Disp: , Rfl:   •  hydrOXYzine (ATARAX) 25 MG tablet, Take 1 tablet by mouth Every 8 (Eight) Hours As Needed for Itching., Disp: 30 tablet, Rfl: 3  •  loratadine (CLARITIN) 10 MG tablet, Take 10 mg by mouth Daily., Disp: , Rfl:       Family History   Problem Relation Age of Onset   • Hypertension Mother    • Hyperlipidemia Mother    • Hypertension Father    • Hyperlipidemia Father    • No Known Problems Brother    • No Known Problems Daughter    • No Known Problems Son    • Lupus Paternal Grandmother    • COPD Paternal Grandfather    • Malig Hyperthermia Neg Hx          Social History     Socioeconomic History   • Marital status:    Tobacco Use   • Smoking status: Never   • Smokeless tobacco: Never   Substance and Sexual Activity   • Alcohol use: No   • Drug use: No   • Sexual activity: Yes     Partners: Female         Allergies   Allergen Reactions   • Oxycodone Other (See Comments)     Makes hyperactive         Pain Scale: 0/10 currently        ROS:  Review of Systems   Musculoskeletal: Negative for back pain, gait problem and neck pain.   Neurological: Negative for dizziness, tremors, seizures, syncope, facial asymmetry, speech difficulty, weakness, light-headedness, numbness and headaches.   Hematological: Negative for adenopathy. Does not bruise/bleed easily.   Psychiatric/Behavioral: Negative for agitation, behavioral problems, confusion, decreased concentration, dysphoric mood, hallucinations, self-injury, sleep disturbance and suicidal ideas. The patient is not nervous/anxious and is not hyperactive.          I have reviewed and agree with the above ROS completed by the medical assistant.      Physical Exam:  Vitals:    10/13/22 1056   Height: 193 cm (75.98\") "     Orthostatic BP:    Body mass index is 25.33 kg/m².    Physical Exam  General: Well-developed white male no acute distress  HEENT: Normocephalic no evidence of trauma.  No tenderness on the scalp.  Brown mole seen on the left scalp between the hairline and the vertex.  Nontender.  No trigger points along the skull base   Neck: Supple  Heart: Regular rate and rhythm  Extremities: No pedal edema      Neurological Exam:   Mental Status: Awake, alert, oriented to person, place and time.  Conversant without evidence of an affective disorder, thought disorder, delusions or hallucinations.  Attention span and concentration are normal.  HCF: No aphasia, apraxia or dysarthria.  Recent and remote memory intact.  Knowledge of recent events intact.  CN: I:   II: Visual fields full without left inattention   III, IV, VI: Eye movements intact without nystagmus or ptosis.  Pupils equal  round and reactive to light.   V,VII: Light touch and pinprick intact all 3 divisions of V.  Facial muscles symmetrical.   VIII: Hearing intact to finger rub   IX,X: Soft palate elevates symmetrically   XI: Sternomastoid and trapezius are strong.   XII: Tongue midline without atrophy or fasciculations  Motor: Normal tone and bulk in the upper and lower extremities   Power testing: No weakness appreciated  Reflexes: Upper extremities:        Lower extremities:        Toe signs:  Sensory: Light touch:        Pinprick:        Vibration:        Position:    Cerebellar: Finger-to-nose: Intact           Rapid movement: Intact           Heel-to-shin: Intact  Gait and Station: Normal casual, toe, heel and tandem walk.  No Romberg no drift    Results:      Lab Results   Component Value Date    GLUCOSE 75 09/28/2020    BUN 18 09/28/2020    CREATININE 0.97 09/28/2020    EGFRIFNONA 82 09/28/2020    EGFRIFAFRI 100 09/28/2020    BCR 18.6 09/28/2020    CO2 27.8 09/28/2020    CALCIUM 9.1 09/28/2020    PROTENTOTREF 6.6 09/28/2020    ALBUMIN 4.20 09/28/2020     LABIL2 1.8 09/28/2020    AST 15 09/28/2020    ALT 15 09/28/2020       Lab Results   Component Value Date    WBC 4.55 09/28/2020    HGB 13.8 09/28/2020    HCT 41.6 09/28/2020    MCV 95.0 09/28/2020     09/28/2020         .No results found for: RPR      Lab Results   Component Value Date    TSH 1.810 08/24/2018    U8JYWPZ 5.7 08/24/2018         Lab Results   Component Value Date    [id number] 946 08/24/2018         No results found for: FOLATE      No results found for: HGBA1C      Lab Results   Component Value Date    GLUCOSE 75 09/28/2020    BUN 18 09/28/2020    CREATININE 0.97 09/28/2020    EGFRIFNONA 82 09/28/2020    EGFRIFAFRI 100 09/28/2020    BCR 18.6 09/28/2020    K 4.6 09/28/2020    CO2 27.8 09/28/2020    CALCIUM 9.1 09/28/2020    PROTENTOTREF 6.6 09/28/2020    ALBUMIN 4.20 09/28/2020    LABIL2 1.8 09/28/2020    AST 15 09/28/2020    ALT 15 09/28/2020         Lab Results   Component Value Date    WBC 4.55 09/28/2020    HGB 13.8 09/28/2020    HCT 41.6 09/28/2020    MCV 95.0 09/28/2020     09/28/2020             Assessment:   1.  Right V2 trigeminal neuralgia-control is good with carbamazepine 200/400.  Has occasional twinges of pain but not enough to up the dosage.  2.  Scalp pain with wearing of a hat.  Origin not entirely clear.  Does not have any irritability at the skull base for classic occipital neuralgia.  3.  Brown mole left frontal scalp        Plan:  1.  Continue carbamazepine 200 a.m. and 400 p.m.  2.  Trial of gabapentin 100 mg 3 times daily.  Dosage could be escalated if needed and no side effects.  Side effect profile of gabapentin reviewed.  3.  CBC and CMP  4.  To follow-up in 1 year    Time: 30 minutes                          Dictated utilizing Dragon dictation.

## 2023-10-09 ENCOUNTER — OFFICE VISIT (OUTPATIENT)
Dept: NEUROLOGY | Facility: CLINIC | Age: 53
End: 2023-10-09
Payer: COMMERCIAL

## 2023-10-09 VITALS
HEART RATE: 78 BPM | OXYGEN SATURATION: 97 % | BODY MASS INDEX: 26.06 KG/M2 | HEIGHT: 76 IN | DIASTOLIC BLOOD PRESSURE: 84 MMHG | WEIGHT: 214 LBS | SYSTOLIC BLOOD PRESSURE: 114 MMHG

## 2023-10-09 DIAGNOSIS — G50.0 TRIGEMINAL NEURALGIA: Primary | ICD-10-CM

## 2023-10-09 DIAGNOSIS — G50.0 TRIGEMINAL NEURALGIA OF RIGHT SIDE OF FACE: ICD-10-CM

## 2023-10-09 PROBLEM — Z96.1 PSEUDOPHAKIA OF RIGHT EYE: Status: ACTIVE | Noted: 2021-06-09

## 2023-10-09 PROBLEM — H25.10 NUCLEAR SENILE CATARACT: Status: ACTIVE | Noted: 2021-04-15

## 2023-10-09 PROBLEM — H43.819 POSTERIOR VITREOUS DETACHMENT: Status: ACTIVE | Noted: 2021-07-02

## 2023-10-09 PROCEDURE — 99214 OFFICE O/P EST MOD 30 MIN: CPT | Performed by: PSYCHIATRY & NEUROLOGY

## 2023-10-09 RX ORDER — CARBAMAZEPINE 200 MG/1
TABLET ORAL
Qty: 270 TABLET | Refills: 3 | Status: SHIPPED | OUTPATIENT
Start: 2023-10-09

## 2023-10-09 NOTE — PROGRESS NOTES
CC: Trigeminal neuralgia right V2    HPI:  Donald Carter is a  53 y.o.  right-handed white male with TGN right V2 segment.  He was last seen 10/13/2022 with the following history taken from that note with additions/modifications as indicated:    He had been evaluated in [place] and apparently his MRI showed no specific abnormality. He has been maintained on carbamazepine currently taking 200 mg morning and 400 mg evening. He does not seem to have any side effects with this dose. For a while he was on 400 mg twice daily and he has some minor side effects. He had some hyponatremia early on but follow-up showed improvement.  He has described some scalp pain which radiates from the hat area or top of the head down the back of his neck.  This occurs shortly after putting a hat on his head.  He does not seem to have shooting pain from the skull base however.  He has occasional twinges of pain from his trigeminal neuralgia but it is tolerable he states.     Last labs 10/13/2022 demonstrate normal CBC with hemoglobin 14, white count 4.6 and platelets 274 and CMP showing sodium 133, normal LFTs with AST 22 and ALT 23.        Past Medical History:   Diagnosis Date    Appendicitis     Gastritis     GERD (gastroesophageal reflux disease)     Head injury When I was 17 I fell out of a farm wagon and hit my head pretty hard.    Kidney stones     Occipital neuralgia     Trigeminal neuralgia          Past Surgical History:   Procedure Laterality Date    APPENDECTOMY N/A 06/22/2018    Procedure: Laparoscopic appendectomy;  Surgeon: Haydee Cortez MD;  Location: [facility];  Service: General    TONSILLECTOMY      UPPER GASTROINTESTINAL ENDOSCOPY      [place]- normal     WISDOM TOOTH EXTRACTION             Current Outpatient Medications:     carBAMazepine (TEGretol) 200 MG tablet, Take 2 tablets by mouth 2 (Two) Times a Day., Disp: 360 tablet, Rfl: 3    cyclobenzaprine (FLEXERIL) 5 MG tablet, TAKE 1 TABLET BY MOUTH TWICE  "DAILY AS NEEDED FOR MUSCLE SPASMS (Patient taking differently: No longer taking per patient), Disp: 60 tablet, Rfl: 5    loratadine (CLARITIN) 10 MG tablet, Take 1 tablet by mouth Daily., Disp: , Rfl:     clobetasol (TEMOVATE) 0.05 % gel, APPLY TO ULCERATIONS WITH GAUZE AFTER EACH MEALS AND EVERY NIGHT AT BEDTIME (Patient not taking: Reported on 10/9/2023), Disp: , Rfl:       Family History   Problem Relation Age of Onset    Hypertension Mother     Hyperlipidemia Mother     Hypertension Father     Hyperlipidemia Father     No Known Problems Brother     No Known Problems Daughter     No Known Problems Son     Lupus Paternal Grandmother     COPD Paternal Grandfather     Malig Hyperthermia Neg Hx          Social History     Socioeconomic History    Marital status:    Tobacco Use    Smoking status: Never     Passive exposure: Never    Smokeless tobacco: Never   Substance and Sexual Activity    Alcohol use: No    Drug use: No    Sexual activity: Yes     Partners: Female         Allergies   Allergen Reactions    Oxycodone Other (See Comments)     Makes hyperactive         Pain Scale: 0/10 currently        ROS:  Review of Systems   Neurological:  Negative for dizziness, tremors, seizures, syncope, facial asymmetry, speech difficulty, weakness, light-headedness, numbness and headaches.   Psychiatric/Behavioral:  Negative for agitation, behavioral problems, confusion, decreased concentration, dysphoric mood, hallucinations, self-injury, sleep disturbance and suicidal ideas. The patient is not nervous/anxious and is not hyperactive.        I have reviewed and agree with the above ROS completed by the medical assistant.      Physical Exam:  Vitals:    10/09/23 1603   Weight: 97.1 kg (214 lb)   Height: 193 cm (75.98\")     Orthostatic BP:    Body mass index is 26.06 kg/mý.    Physical Exam  General: Well-developed white male no acute distress  HEENT: Normocephalic no evidence of trauma  Neck: Supple  Heart: Regular rate " "and rhythm  Extremities: No pedal edema      Neurological Exam:   Mental Status: Awake, alert, oriented to person, place and time.  Conversant without evidence of an affective disorder, thought disorder, delusions or hallucinations.  Attention span and concentration are normal.  HCF: No aphasia, apraxia or dysarthria.  Recent and remote memory intact.  Knowledge of recent events intact.  CN: I:   II: Visual fields full without left inattention   III, IV, VI: Eye movements intact without nystagmus or ptosis.  Pupils equal  round and reactive to light.   V,VII: Light touch  intact all 3 divisions of V.  Facial muscles symmetrical.   VIII: Hearing intact to finger rub   IX,X: Soft palate elevates symmetrically   XI: Sternomastoid and trapezius are strong.   XII: Tongue midline without atrophy or fasciculations  Motor: Normal tone and bulk in the upper and lower extremities   Power testing:  Reflexes: Upper extremities:        Lower extremities:        Toe signs:  Sensory: Light touch:        Pinprick:        Vibration:        Position:    Cerebellar: Finger-to-nose: Normal           Rapid movement: Normal           Heel-to-shin:  Gait and Station: Normal casual, toe, heel and tandem walk.  No Romberg no drift    Results:      Lab Results   Component Value Date    GLUCOSE 89 10/13/2022    BUN 9 10/13/2022    CREATININE 0.89 10/13/2022    EGFRIFNONA 82 09/28/2020    EGFRIFAFRI 100 09/28/2020    BCR 10.1 10/13/2022    CO2 26.0 10/13/2022    CALCIUM 9.3 10/13/2022    PROTENTOTREF 6.6 09/28/2020    ALBUMIN 4.40 10/13/2022    LABIL2 1.8 09/28/2020    AST 22 10/13/2022    ALT 23 10/13/2022       Lab Results   Component Value Date    WBC 4.60 10/13/2022    HGB 14.0 10/13/2022    HCT 39.9 10/13/2022    MCV 89.9 10/13/2022     10/13/2022         .No results found for: \"RPR\"      Lab Results   Component Value Date    TSH 1.810 08/24/2018    Y6MGCHZ 5.7 08/24/2018         Lab Results   Component Value Date    [id number] 946 " "08/24/2018         No results found for: \"FOLATE\"      No results found for: \"HGBA1C\"      Lab Results   Component Value Date    GLUCOSE 89 10/13/2022    BUN 9 10/13/2022    CREATININE 0.89 10/13/2022    EGFRIFNONA 82 09/28/2020    EGFRIFAFRI 100 09/28/2020    BCR 10.1 10/13/2022    K 4.6 10/13/2022    CO2 26.0 10/13/2022    CALCIUM 9.3 10/13/2022    PROTENTOTREF 6.6 09/28/2020    ALBUMIN 4.40 10/13/2022    LABIL2 1.8 09/28/2020    AST 22 10/13/2022    ALT 23 10/13/2022         Lab Results   Component Value Date    WBC 4.60 10/13/2022    HGB 14.0 10/13/2022    HCT 39.9 10/13/2022    MCV 89.9 10/13/2022     10/13/2022             Assessment:   1.  Trigeminal neuralgia, right V2 distribution-no significant changes in the past year.  Minor symptoms which are tolerable  2.  Hyponatremia due to carbamazepine-mild currently        Plan:  1.  Continue carbamazepine 200 mg morning 400 mg at night  2.  Check CBC and CMP and follow-up in a year with Chelo Bernstein NP          Time: 30 minutes              Dictated utilizing Dragon dictation.   "

## 2023-12-18 ENCOUNTER — LAB (OUTPATIENT)
Dept: LAB | Facility: HOSPITAL | Age: 53
End: 2023-12-18
Payer: COMMERCIAL

## 2023-12-18 LAB
ALBUMIN SERPL-MCNC: 4.3 G/DL (ref 3.5–5.2)
ALBUMIN/GLOB SERPL: 1.5 G/DL
ALP SERPL-CCNC: 66 U/L (ref 39–117)
ALT SERPL W P-5'-P-CCNC: 23 U/L (ref 1–41)
ANION GAP SERPL CALCULATED.3IONS-SCNC: 10.7 MMOL/L (ref 5–15)
AST SERPL-CCNC: 20 U/L (ref 1–40)
BASOPHILS # BLD AUTO: 0.01 10*3/MM3 (ref 0–0.2)
BASOPHILS NFR BLD AUTO: 0.2 % (ref 0–1.5)
BILIRUB SERPL-MCNC: <0.2 MG/DL (ref 0–1.2)
BUN SERPL-MCNC: 13 MG/DL (ref 6–20)
BUN/CREAT SERPL: 13.3 (ref 7–25)
CALCIUM SPEC-SCNC: 9.2 MG/DL (ref 8.6–10.5)
CHLORIDE SERPL-SCNC: 98 MMOL/L (ref 98–107)
CO2 SERPL-SCNC: 26.3 MMOL/L (ref 22–29)
CREAT SERPL-MCNC: 0.98 MG/DL (ref 0.76–1.27)
DEPRECATED RDW RBC AUTO: 41.8 FL (ref 37–54)
EGFRCR SERPLBLD CKD-EPI 2021: 92.8 ML/MIN/1.73
EOSINOPHIL # BLD AUTO: 0.01 10*3/MM3 (ref 0–0.4)
EOSINOPHIL NFR BLD AUTO: 0.2 % (ref 0.3–6.2)
ERYTHROCYTE [DISTWIDTH] IN BLOOD BY AUTOMATED COUNT: 11.9 % (ref 12.3–15.4)
GLOBULIN UR ELPH-MCNC: 2.8 GM/DL
GLUCOSE SERPL-MCNC: 89 MG/DL (ref 65–99)
HCT VFR BLD AUTO: 40.9 % (ref 37.5–51)
HGB BLD-MCNC: 13.9 G/DL (ref 13–17.7)
IMM GRANULOCYTES # BLD AUTO: 0.03 10*3/MM3 (ref 0–0.05)
IMM GRANULOCYTES NFR BLD AUTO: 0.6 % (ref 0–0.5)
LYMPHOCYTES # BLD AUTO: 2.27 10*3/MM3 (ref 0.7–3.1)
LYMPHOCYTES NFR BLD AUTO: 45.5 % (ref 19.6–45.3)
MCH RBC QN AUTO: 32.1 PG (ref 26.6–33)
MCHC RBC AUTO-ENTMCNC: 34 G/DL (ref 31.5–35.7)
MCV RBC AUTO: 94.5 FL (ref 79–97)
MONOCYTES # BLD AUTO: 0.37 10*3/MM3 (ref 0.1–0.9)
MONOCYTES NFR BLD AUTO: 7.4 % (ref 5–12)
NEUTROPHILS NFR BLD AUTO: 2.3 10*3/MM3 (ref 1.7–7)
NEUTROPHILS NFR BLD AUTO: 46.1 % (ref 42.7–76)
NRBC BLD AUTO-RTO: 0 /100 WBC (ref 0–0.2)
PLATELET # BLD AUTO: 331 10*3/MM3 (ref 140–450)
PMV BLD AUTO: 8.7 FL (ref 6–12)
POTASSIUM SERPL-SCNC: 3.9 MMOL/L (ref 3.5–5.2)
PROT SERPL-MCNC: 7.1 G/DL (ref 6–8.5)
RBC # BLD AUTO: 4.33 10*6/MM3 (ref 4.14–5.8)
SODIUM SERPL-SCNC: 135 MMOL/L (ref 136–145)
WBC NRBC COR # BLD AUTO: 4.99 10*3/MM3 (ref 3.4–10.8)

## 2023-12-18 PROCEDURE — 85025 COMPLETE CBC W/AUTO DIFF WBC: CPT | Performed by: PSYCHIATRY & NEUROLOGY

## 2023-12-18 PROCEDURE — 80053 COMPREHEN METABOLIC PANEL: CPT | Performed by: PSYCHIATRY & NEUROLOGY

## 2023-12-20 ENCOUNTER — TELEPHONE (OUTPATIENT)
Dept: NEUROLOGY | Facility: CLINIC | Age: 53
End: 2023-12-20
Payer: COMMERCIAL

## 2023-12-20 NOTE — TELEPHONE ENCOUNTER
Lucretia,    Let the patient know the following:    Sodium level was 135 which is borderline low.  It is not low enough to prevent escalation of the dosage of carbamazepine however.    It appears you are on 200 mg morning and 400 mg at night.  You can increase the dose to 400 mg twice daily.  We had previously checked carbamazepine levels presumably on the same dose and they were around 5.8-6.2.  An added 200 mg tablet will likely increase this to about 8 and I suspect he will still tolerate it okay.  It takes about 3 days to achieve a steady state and so another week or 2 should be enough to decide if that added tablet will help sufficiently or not.  Let us know.    Jas Calderon MD

## 2024-10-14 DIAGNOSIS — G50.0 TRIGEMINAL NEURALGIA OF RIGHT SIDE OF FACE: ICD-10-CM

## 2024-10-14 RX ORDER — CARBAMAZEPINE 200 MG/1
TABLET ORAL
Qty: 270 TABLET | Refills: 3 | Status: SHIPPED | OUTPATIENT
Start: 2024-10-14

## 2024-10-14 NOTE — TELEPHONE ENCOUNTER
Caller: Donald Carter    Relationship: Self    Best call back number:   Telephone Information:   Mobile [phone]         Requested Prescriptions:   Requested Prescriptions     Pending Prescriptions Disp Refills    carBAMazepine (TEGretol) 200 MG tablet 270 tablet 3     Si tablet each morning and 2 tablets each evening        Pharmacy where request should be sent: [id number] - [place], [place] - [address] [facility] [address] [place] [address] - [phone] [facility] [phone] FX     Last office visit with prescribing clinician: 10/9/2023   Last telemedicine visit with prescribing clinician: Visit date not found   Next office visit with prescribing clinician: 2025     Additional details provided by patient: PT STATES HE HAS 4-5 DAYS WORTH OF MEDICATION LEFT.     Does the patient have less than a 3 day supply:  [] Yes  [x] No    Would you like a call back once the refill request has been completed: [] Yes [x] No    If the office needs to give you a call back, can they leave a voicemail: [] Yes [x] No    Victor Hugo Smiley Rep   10/14/24 09:46 EDT

## 2025-01-16 DIAGNOSIS — G50.0 TRIGEMINAL NEURALGIA OF RIGHT SIDE OF FACE: ICD-10-CM

## 2025-01-16 RX ORDER — CARBAMAZEPINE 200 MG/1
TABLET ORAL
Qty: 270 TABLET | Refills: 3 | Status: SHIPPED | OUTPATIENT
Start: 2025-01-16

## 2025-02-17 ENCOUNTER — TELEPHONE (OUTPATIENT)
Dept: NEUROLOGY | Facility: CLINIC | Age: 55
End: 2025-02-17

## 2025-02-17 ENCOUNTER — OFFICE VISIT (OUTPATIENT)
Dept: NEUROLOGY | Facility: CLINIC | Age: 55
End: 2025-02-17
Payer: COMMERCIAL

## 2025-02-17 VITALS
BODY MASS INDEX: 25.33 KG/M2 | DIASTOLIC BLOOD PRESSURE: 86 MMHG | HEART RATE: 86 BPM | SYSTOLIC BLOOD PRESSURE: 120 MMHG | HEIGHT: 76 IN | OXYGEN SATURATION: 97 % | WEIGHT: 208 LBS

## 2025-02-17 DIAGNOSIS — G50.0 TRIGEMINAL NEURALGIA OF RIGHT SIDE OF FACE: Primary | ICD-10-CM

## 2025-02-17 PROCEDURE — 99214 OFFICE O/P EST MOD 30 MIN: CPT | Performed by: PSYCHIATRY & NEUROLOGY

## 2025-02-17 RX ORDER — CLOBETASOL PROPIONATE 0.05 MG/G
GEL TOPICAL
COMMUNITY
Start: 2025-01-23

## 2025-02-17 RX ORDER — CYCLOBENZAPRINE HCL 5 MG
TABLET ORAL
COMMUNITY
Start: 2025-02-07

## 2025-02-17 NOTE — TELEPHONE ENCOUNTER
Please obtain the following records on this patient  1.  [facility] Banuelos NP in [place] [phone]       Most recent CBC and CMP.  If a carbamazepine is available obtained that also    2.  Report of MRI brain done about 2017 from [facility]    Thanks  IMANI

## 2025-02-17 NOTE — PROGRESS NOTES
CC: Right-sided trigeminal neuralgia    HPI:  Donald Carter is a  54 y.o.  right-handed white male who I am seeing on yearly follow-up with right-sided trigeminal neuralgia.  The patient states that he has been well-controlled over the past year.  He occasionally has some tightness in the right face which let him know that it is still there but it is not really painful.  That may go on for a few days or even a week or 2 and then it dissipates.  No other new symptoms.  He has history of occipital neuralgia and some neck tightness sometimes using Flexeril for.  I saw him last 10/9/2023 with the following history from that note with additions/modifications as indicated:    He had been evaluated in [place], [place] and apparently his MRI at that hospital which showed no specific abnormality he states. He has been maintained on carbamazepine currently taking 200 mg morning and 400 mg evening. He does not seem to have any side effects with this dose. For a while he was on 400 mg twice daily and he has some minor side effects.  He has not had to escalate the dosage in the past year.  He had some hyponatremia early on but follow-up showed improvement.  He has described some scalp pain which radiates from the hat area or top of the head down the back of his neck.  This occurs shortly after putting a hat on his head.  He does not seem to have shooting pain from the skull base however.  He has occasional twinges of pain from his trigeminal neuralgia but it is tolerable he states.    Last labs were done 12/18/2023 showing sodium 135, AST 20, ALT 23, hemoglobin 13.9 hematocrit 40.9, white count 4990 and platelet count 331,000.  He is pretty sure he had more recent labs done at his [facility] doctors office, [facility]   [phone].           Past Medical History:   Diagnosis Date    Appendicitis     Gastritis     GERD (gastroesophageal reflux disease)     Head injury When I was 17 I fell out of a farm wagon and hit my head  "pretty hard.    Kidney stones     Occipital neuralgia     Trigeminal neuralgia          Past Surgical History:   Procedure Laterality Date    APPENDECTOMY N/A 06/22/2018    Procedure: Laparoscopic appendectomy;  Surgeon: Haydee Cortez MD;  Location: [facility];  Service: General    TONSILLECTOMY      UPPER GASTROINTESTINAL ENDOSCOPY      [place]- normal     WISDOM TOOTH EXTRACTION             Current Outpatient Medications:     carBAMazepine (TEGretol) 200 MG tablet, 1 tablet each morning and 2 tablets each evening, Disp: 270 tablet, Rfl: 3    clobetasol (TEMOVATE) 0.05 % gel, APPLY TOPICALLY TO ULCERATION WITH A GAUZE AFTER EACH MEAL AND AT BEDTIME, Disp: , Rfl:     cyclobenzaprine (FLEXERIL) 5 MG tablet, TAKE 1 TO 2 TABLETS BY MOUTH THREE TIMES DAILY AS NEEDED, Disp: , Rfl:     loratadine (CLARITIN) 10 MG tablet, Take 1 tablet by mouth Daily., Disp: , Rfl:       Family History   Problem Relation Age of Onset    Hypertension Mother     Hyperlipidemia Mother     Hypertension Father     Hyperlipidemia Father     No Known Problems Brother     No Known Problems Daughter     No Known Problems Son     Lupus Paternal Grandmother     COPD Paternal Grandfather     Malig Hyperthermia Neg Hx          Social History     Socioeconomic History    Marital status:    Tobacco Use    Smoking status: Never     Passive exposure: Never    Smokeless tobacco: Never   Vaping Use    Vaping status: Never Used   Substance and Sexual Activity    Alcohol use: No    Drug use: No    Sexual activity: Yes     Partners: Female         Allergies   Allergen Reactions    Oxycodone Other (See Comments)     Makes hyperactive         Pain Scale: 0/10 currently        ROS:  Review of Systems      I have reviewed and agree with the above ROS completed by the medical assistant.      Physical Exam:  Vitals:    02/17/25 1609   Weight: 94.3 kg (208 lb)   Height: 193 cm (75.98\")     Orthostatic BP:    Body mass index is 25.33 kg/m².    Physical " "Exam  General: Well-developed white male no acute distress  HEENT: Normocephalic no evidence of trauma  Neck: Supple  Heart: Regular rate and rhythm  Extremities: No edema      Neurological Exam:   Mental Status: Awake, alert, oriented to person, place and time.  Conversant without evidence of an affective disorder, thought disorder, delusions or hallucinations.  Attention span and concentration are normal.  HCF: No aphasia, apraxia or dysarthria.  Recent and remote memory intact.  Knowledge of recent events intact.  CN: I:   II: Visual fields full without left inattention   III, IV, VI: Eye movements intact without nystagmus or ptosis.  Pupils equal  round and reactive to light.   V,VII: Light touch and pinprick intact all 3 divisions of V.  Facial muscles symmetrical.   VIII: Hearing intact to finger rub   IX,X: Soft palate elevates symmetrically   XI: Sternomastoid and trapezius are strong.   XII: Tongue midline without atrophy or fasciculations  Motor: Normal tone and bulk in the upper and lower extremities   Power testing: No focal weakness  Reflexes: Upper extremities:        Lower extremities:        Toe signs:  Sensory: Light touch:        Pinprick:        Vibration:        Position:    Cerebellar: Finger-to-nose: Normal normal           Rapid movement:           Heel-to-shin:  Gait and Station: Normal casual toe heel and tandem walk.  No Romberg no drift    Results:      Lab Results   Component Value Date    GLUCOSE 89 12/18/2023    BUN 13 12/18/2023    CREATININE 0.98 12/18/2023    EGFRIFNONA 82 09/28/2020    EGFRIFAFRI 100 09/28/2020    BCR 13.3 12/18/2023    CO2 26.3 12/18/2023    CALCIUM 9.2 12/18/2023    ALBUMIN 4.3 12/18/2023    AST 20 12/18/2023    ALT 23 12/18/2023       Lab Results   Component Value Date    WBC 4.99 12/18/2023    HGB 13.9 12/18/2023    HCT 40.9 12/18/2023    MCV 94.5 12/18/2023     12/18/2023         .No results found for: \"RPR\"      Lab Results   Component Value Date    TSH " "1.810 08/24/2018    F7JZSFP 5.7 08/24/2018         Lab Results   Component Value Date    HJNLREHJ40 946 08/24/2018         No results found for: \"FOLATE\"      No results found for: \"HGBA1C\"      Lab Results   Component Value Date    GLUCOSE 89 12/18/2023    BUN 13 12/18/2023    CREATININE 0.98 12/18/2023    EGFRIFNONA 82 09/28/2020    EGFRIFAFRI 100 09/28/2020    BCR 13.3 12/18/2023    K 3.9 12/18/2023    CO2 26.3 12/18/2023    CALCIUM 9.2 12/18/2023    ALBUMIN 4.3 12/18/2023    AST 20 12/18/2023    ALT 23 12/18/2023         Lab Results   Component Value Date    WBC 4.99 12/18/2023    HGB 13.9 12/18/2023    HCT 40.9 12/18/2023    MCV 94.5 12/18/2023     12/18/2023             Assessment:   1.  Right V2 trigeminal neuralgia-quiescent with minimal recurrent symptoms periodically on carbamazepine 200/400 daily.          Plan:  1.  Will try to get any recent labs from his primary office, [facility], [phone]  2.  Will also try to obtain report of his MRI back in [facility]  3.  Follow-up with me in about 1 year      Time: 30 minutes                Dictated utilizing Dragon dictation.   "

## 2025-02-20 ENCOUNTER — DOCUMENTATION (OUTPATIENT)
Dept: NEUROLOGY | Facility: CLINIC | Age: 55
End: 2025-02-20
Payer: COMMERCIAL

## 2025-06-06 NOTE — TELEPHONE ENCOUNTER
----- Message from Ata Fang sent at 10/22/2019  9:04 AM EDT -----  Contact: PT  Per pt he received his labs and it shows his sodium level being very low and he wants to talk to Dr. Calderon to see what he needs to do to bring it up.  Please call him at [phone].    ThanksAnna Marie   8

## (undated) DEVICE — ANTIBACTERIAL UNDYED BRAIDED (POLYGLACTIN 910), SYNTHETIC ABSORBABLE SUTURE: Brand: COATED VICRYL

## (undated) DEVICE — VISUALIZATION SYSTEM: Brand: CLEARIFY

## (undated) DEVICE — APPL HEMO SURG ARISTA/AH/FLEXITIP XL 38CM

## (undated) DEVICE — TOTAL TRAY, 16FR 10ML SIL FOLEY, URN: Brand: MEDLINE

## (undated) DEVICE — ENDOPATH XCEL BLUNT TIP TROCARS WITH SMOOTH SLEEVES: Brand: ENDOPATH XCEL

## (undated) DEVICE — ENDOPATH XCEL BLADELESS TROCARS WITH STABILITY SLEEVES: Brand: ENDOPATH XCEL

## (undated) DEVICE — LOU LAP CHOLE: Brand: MEDLINE INDUSTRIES, INC.

## (undated) DEVICE — ENDOPOUCH RETRIEVER SPECIMEN RETRIEVAL BAGS: Brand: ENDOPOUCH RETRIEVER

## (undated) DEVICE — ENDOPATH XCEL UNIVERSAL TROCAR STABLILITY SLEEVES: Brand: ENDOPATH XCEL

## (undated) DEVICE — GOWN ,SIRUS,NONREINFORCED SMALL: Brand: MEDLINE

## (undated) DEVICE — ENDOPATH ETS-FLEX45 ARTICULATING ENDOSCOPIC LINEAR CUTTER, NO RELOAD: Brand: ENDOPATH

## (undated) DEVICE — ADHS SKIN DERMABOND TOP ADVANCED

## (undated) DEVICE — SUT VIC 0/0 UR6 27IN DYED J603H

## (undated) DEVICE — UNDYED BRAIDED (POLYGLACTIN 910), SYNTHETIC ABSORBABLE SUTURE: Brand: COATED VICRYL

## (undated) DEVICE — APPL CHLORAPREP W/TINT 26ML ORNG

## (undated) DEVICE — GLV SURG SENSICARE GREEN W/ALOE PF LF 6.5 STRL

## (undated) DEVICE — GLV SURG BIOGEL LTX PF 6

## (undated) DEVICE — ENDOCUT SCISSOR TIP, DISPOSABLE: Brand: RENEW